# Patient Record
Sex: MALE | Race: WHITE | NOT HISPANIC OR LATINO | Employment: FULL TIME | URBAN - METROPOLITAN AREA
[De-identification: names, ages, dates, MRNs, and addresses within clinical notes are randomized per-mention and may not be internally consistent; named-entity substitution may affect disease eponyms.]

---

## 2017-02-05 ENCOUNTER — APPOINTMENT (OUTPATIENT)
Dept: GENERAL RADIOLOGY | Facility: HOSPITAL | Age: 27
End: 2017-02-05

## 2017-02-05 PROCEDURE — 73130 X-RAY EXAM OF HAND: CPT | Performed by: FAMILY MEDICINE

## 2017-05-18 ENCOUNTER — OFFICE VISIT (OUTPATIENT)
Dept: GASTROENTEROLOGY | Facility: CLINIC | Age: 27
End: 2017-05-18

## 2017-05-18 VITALS
DIASTOLIC BLOOD PRESSURE: 64 MMHG | SYSTOLIC BLOOD PRESSURE: 110 MMHG | BODY MASS INDEX: 26.75 KG/M2 | HEIGHT: 74 IN | WEIGHT: 208.4 LBS

## 2017-05-18 DIAGNOSIS — K62.5 RECTAL BLEED: Primary | ICD-10-CM

## 2017-05-18 PROCEDURE — 99203 OFFICE O/P NEW LOW 30 MIN: CPT | Performed by: INTERNAL MEDICINE

## 2017-05-18 RX ORDER — SODIUM CHLORIDE 0.9 % (FLUSH) 0.9 %
1-10 SYRINGE (ML) INJECTION AS NEEDED
Status: CANCELLED | OUTPATIENT
Start: 2017-05-18

## 2017-05-31 ENCOUNTER — TELEPHONE (OUTPATIENT)
Dept: GASTROENTEROLOGY | Facility: CLINIC | Age: 27
End: 2017-05-31

## 2017-05-31 ENCOUNTER — OUTSIDE FACILITY SERVICE (OUTPATIENT)
Dept: GASTROENTEROLOGY | Facility: CLINIC | Age: 27
End: 2017-05-31

## 2017-05-31 PROCEDURE — 45378 DIAGNOSTIC COLONOSCOPY: CPT | Performed by: INTERNAL MEDICINE

## 2017-05-31 RX ORDER — HYDROCORTISONE ACETATE 25 MG/1
25 SUPPOSITORY RECTAL NIGHTLY
Qty: 30 SUPPOSITORY | Refills: 1 | Status: SHIPPED | OUTPATIENT
Start: 2017-05-31 | End: 2019-06-11

## 2017-06-07 ENCOUNTER — TELEPHONE (OUTPATIENT)
Dept: GASTROENTEROLOGY | Facility: CLINIC | Age: 27
End: 2017-06-07

## 2017-06-07 NOTE — TELEPHONE ENCOUNTER
----- Message from Jeffry Heard MD sent at 6/6/2017 12:46 PM EDT -----  Regarding: FW: Procedure    Office visit 8 weeks    ----- Message -----     From: Samuel Antony     Sent: 6/6/2017  12:09 PM       To: Jeffry Heard MD  Subject: Procedure                                        Scanned in

## 2019-06-02 ENCOUNTER — HOSPITAL ENCOUNTER (INPATIENT)
Facility: HOSPITAL | Age: 29
LOS: 2 days | Discharge: HOME | End: 2019-06-04
Attending: EMERGENCY MEDICINE | Admitting: HOSPITALIST

## 2019-06-02 ENCOUNTER — APPOINTMENT (OUTPATIENT)
Dept: GENERAL RADIOLOGY | Facility: HOSPITAL | Age: 29
End: 2019-06-02

## 2019-06-02 DIAGNOSIS — S91.031A PUNCTURE WOUND OF RIGHT ANKLE, INITIAL ENCOUNTER: Primary | ICD-10-CM

## 2019-06-02 DIAGNOSIS — L03.115 CELLULITIS OF RIGHT LOWER EXTREMITY: ICD-10-CM

## 2019-06-02 PROBLEM — L03.90 CELLULITIS: Status: ACTIVE | Noted: 2019-06-02

## 2019-06-02 LAB
ALBUMIN SERPL-MCNC: 4.6 G/DL (ref 3.5–5.2)
ALBUMIN/GLOB SERPL: 1.5 G/DL
ALP SERPL-CCNC: 64 U/L (ref 39–117)
ALT SERPL W P-5'-P-CCNC: 30 U/L (ref 1–41)
ANION GAP SERPL CALCULATED.3IONS-SCNC: 14.2 MMOL/L
AST SERPL-CCNC: 29 U/L (ref 1–40)
BASOPHILS # BLD AUTO: 0.06 10*3/MM3 (ref 0–0.2)
BASOPHILS NFR BLD AUTO: 0.3 % (ref 0–1.5)
BILIRUB SERPL-MCNC: 1 MG/DL (ref 0.2–1.2)
BUN BLD-MCNC: 12 MG/DL (ref 6–20)
BUN/CREAT SERPL: 11 (ref 7–25)
CALCIUM SPEC-SCNC: 9.4 MG/DL (ref 8.6–10.5)
CHLORIDE SERPL-SCNC: 97 MMOL/L (ref 98–107)
CO2 SERPL-SCNC: 24.8 MMOL/L (ref 22–29)
CREAT BLD-MCNC: 1.09 MG/DL (ref 0.76–1.27)
D-LACTATE SERPL-SCNC: 0.9 MMOL/L (ref 0.5–2)
DEPRECATED RDW RBC AUTO: 49.1 FL (ref 37–54)
EOSINOPHIL # BLD AUTO: 0.04 10*3/MM3 (ref 0–0.4)
EOSINOPHIL NFR BLD AUTO: 0.2 % (ref 0.3–6.2)
ERYTHROCYTE [DISTWIDTH] IN BLOOD BY AUTOMATED COUNT: 14.2 % (ref 12.3–15.4)
GFR SERPL CREATININE-BSD FRML MDRD: 81 ML/MIN/1.73
GLOBULIN UR ELPH-MCNC: 3 GM/DL
GLUCOSE BLD-MCNC: 107 MG/DL (ref 65–99)
HCT VFR BLD AUTO: 42.3 % (ref 37.5–51)
HGB BLD-MCNC: 14.1 G/DL (ref 13–17.7)
IMM GRANULOCYTES # BLD AUTO: 0.13 10*3/MM3 (ref 0–0.05)
IMM GRANULOCYTES NFR BLD AUTO: 0.6 % (ref 0–0.5)
LYMPHOCYTES # BLD AUTO: 1.2 10*3/MM3 (ref 0.7–3.1)
LYMPHOCYTES NFR BLD AUTO: 5.3 % (ref 19.6–45.3)
MCH RBC QN AUTO: 31.6 PG (ref 26.6–33)
MCHC RBC AUTO-ENTMCNC: 33.3 G/DL (ref 31.5–35.7)
MCV RBC AUTO: 94.8 FL (ref 79–97)
MONOCYTES # BLD AUTO: 3.09 10*3/MM3 (ref 0.1–0.9)
MONOCYTES NFR BLD AUTO: 13.7 % (ref 5–12)
NEUTROPHILS # BLD AUTO: 17.99 10*3/MM3 (ref 1.7–7)
NEUTROPHILS NFR BLD AUTO: 79.9 % (ref 42.7–76)
NRBC BLD AUTO-RTO: 0 /100 WBC (ref 0–0.2)
PLATELET # BLD AUTO: 235 10*3/MM3 (ref 140–450)
PMV BLD AUTO: 9.8 FL (ref 6–12)
POTASSIUM BLD-SCNC: 3.8 MMOL/L (ref 3.5–5.2)
PROT SERPL-MCNC: 7.6 G/DL (ref 6–8.5)
RBC # BLD AUTO: 4.46 10*6/MM3 (ref 4.14–5.8)
SODIUM BLD-SCNC: 136 MMOL/L (ref 136–145)
WBC NRBC COR # BLD: 22.51 10*3/MM3 (ref 3.4–10.8)

## 2019-06-02 PROCEDURE — 25010000002 ENOXAPARIN PER 10 MG: Performed by: HOSPITALIST

## 2019-06-02 PROCEDURE — 25010000002 PIPERACILLIN SOD-TAZOBACTAM PER 1 G: Performed by: NURSE PRACTITIONER

## 2019-06-02 PROCEDURE — 87040 BLOOD CULTURE FOR BACTERIA: CPT | Performed by: NURSE PRACTITIONER

## 2019-06-02 PROCEDURE — 25010000002 ONDANSETRON PER 1 MG: Performed by: NURSE PRACTITIONER

## 2019-06-02 PROCEDURE — 83605 ASSAY OF LACTIC ACID: CPT | Performed by: NURSE PRACTITIONER

## 2019-06-02 PROCEDURE — 25010000002 HYDROMORPHONE PER 4 MG: Performed by: NURSE PRACTITIONER

## 2019-06-02 PROCEDURE — 99284 EMERGENCY DEPT VISIT MOD MDM: CPT

## 2019-06-02 PROCEDURE — 80053 COMPREHEN METABOLIC PANEL: CPT | Performed by: NURSE PRACTITIONER

## 2019-06-02 PROCEDURE — 85025 COMPLETE CBC W/AUTO DIFF WBC: CPT | Performed by: NURSE PRACTITIONER

## 2019-06-02 PROCEDURE — 73610 X-RAY EXAM OF ANKLE: CPT

## 2019-06-02 RX ORDER — ACETAMINOPHEN 325 MG/1
650 TABLET ORAL EVERY 4 HOURS PRN
Status: DISCONTINUED | OUTPATIENT
Start: 2019-06-02 | End: 2019-06-04 | Stop reason: HOSPADM

## 2019-06-02 RX ORDER — IBUPROFEN 600 MG/1
600 TABLET ORAL EVERY 6 HOURS PRN
Status: DISCONTINUED | OUTPATIENT
Start: 2019-06-02 | End: 2019-06-04 | Stop reason: HOSPADM

## 2019-06-02 RX ORDER — ONDANSETRON 2 MG/ML
4 INJECTION INTRAMUSCULAR; INTRAVENOUS EVERY 6 HOURS PRN
Status: DISCONTINUED | OUTPATIENT
Start: 2019-06-02 | End: 2019-06-04 | Stop reason: HOSPADM

## 2019-06-02 RX ORDER — BISACODYL 10 MG
10 SUPPOSITORY, RECTAL RECTAL DAILY PRN
Status: DISCONTINUED | OUTPATIENT
Start: 2019-06-02 | End: 2019-06-04 | Stop reason: HOSPADM

## 2019-06-02 RX ORDER — SODIUM CHLORIDE 0.9 % (FLUSH) 0.9 %
3-10 SYRINGE (ML) INJECTION AS NEEDED
Status: DISCONTINUED | OUTPATIENT
Start: 2019-06-02 | End: 2019-06-04 | Stop reason: HOSPADM

## 2019-06-02 RX ORDER — SODIUM CHLORIDE 0.9 % (FLUSH) 0.9 %
10 SYRINGE (ML) INJECTION AS NEEDED
Status: DISCONTINUED | OUTPATIENT
Start: 2019-06-02 | End: 2019-06-04 | Stop reason: HOSPADM

## 2019-06-02 RX ORDER — HYDROCODONE BITARTRATE AND ACETAMINOPHEN 10; 325 MG/1; MG/1
1 TABLET ORAL EVERY 6 HOURS PRN
Status: DISCONTINUED | OUTPATIENT
Start: 2019-06-02 | End: 2019-06-04 | Stop reason: HOSPADM

## 2019-06-02 RX ORDER — ONDANSETRON 2 MG/ML
4 INJECTION INTRAMUSCULAR; INTRAVENOUS ONCE
Status: COMPLETED | OUTPATIENT
Start: 2019-06-02 | End: 2019-06-02

## 2019-06-02 RX ORDER — ONDANSETRON 4 MG/1
4 TABLET, FILM COATED ORAL EVERY 6 HOURS PRN
Status: DISCONTINUED | OUTPATIENT
Start: 2019-06-02 | End: 2019-06-04 | Stop reason: HOSPADM

## 2019-06-02 RX ORDER — SODIUM CHLORIDE 0.9 % (FLUSH) 0.9 %
3 SYRINGE (ML) INJECTION EVERY 12 HOURS SCHEDULED
Status: DISCONTINUED | OUTPATIENT
Start: 2019-06-02 | End: 2019-06-04 | Stop reason: HOSPADM

## 2019-06-02 RX ORDER — FAMOTIDINE 20 MG/1
20 TABLET, FILM COATED ORAL DAILY
Status: DISCONTINUED | OUTPATIENT
Start: 2019-06-02 | End: 2019-06-04 | Stop reason: HOSPADM

## 2019-06-02 RX ORDER — BISACODYL 5 MG/1
5 TABLET, DELAYED RELEASE ORAL DAILY PRN
Status: DISCONTINUED | OUTPATIENT
Start: 2019-06-02 | End: 2019-06-04 | Stop reason: HOSPADM

## 2019-06-02 RX ORDER — HYDROMORPHONE HYDROCHLORIDE 1 MG/ML
0.5 INJECTION, SOLUTION INTRAMUSCULAR; INTRAVENOUS; SUBCUTANEOUS ONCE
Status: COMPLETED | OUTPATIENT
Start: 2019-06-02 | End: 2019-06-02

## 2019-06-02 RX ADMIN — TAZOBACTAM SODIUM AND PIPERACILLIN SODIUM 3.38 G: 375; 3 INJECTION, SOLUTION INTRAVENOUS at 18:00

## 2019-06-02 RX ADMIN — TAZOBACTAM SODIUM AND PIPERACILLIN SODIUM 3.38 G: 375; 3 INJECTION, SOLUTION INTRAVENOUS at 23:51

## 2019-06-02 RX ADMIN — ENOXAPARIN SODIUM 40 MG: 40 INJECTION SUBCUTANEOUS at 20:04

## 2019-06-02 RX ADMIN — FAMOTIDINE 20 MG: 20 TABLET, FILM COATED ORAL at 22:31

## 2019-06-02 RX ADMIN — HYDROMORPHONE HYDROCHLORIDE 0.5 MG: 1 INJECTION, SOLUTION INTRAMUSCULAR; INTRAVENOUS; SUBCUTANEOUS at 17:42

## 2019-06-02 RX ADMIN — HYDROCODONE BITARTRATE AND ACETAMINOPHEN 1 TABLET: 10; 325 TABLET ORAL at 21:36

## 2019-06-02 RX ADMIN — ONDANSETRON HYDROCHLORIDE 4 MG: 2 SOLUTION INTRAMUSCULAR; INTRAVENOUS at 17:41

## 2019-06-02 RX ADMIN — Medication 3 ML: at 21:29

## 2019-06-02 NOTE — H&P
Name: Rafy Oliveira ADMIT: 2019   : 1990  PCP: Provider, No Known    MRN: [id number] LOS: 0 days   AGE/SEX: 28 y.o. male  ROOM:      Chief Complaint   Patient presents with   • Puncture Wound     right heel       Subjective   Patient is a 28 y.o. male who presents to [facility] with the above chief complaint.  Yesterday he was playing volleyball and the ball rolled into a dirty pond.  He walked down into the water to get the ball and stepped on a board that swung forward and a nail poking out of the board punctured an area on the back part of his ankle.  He pulled the nail out without any difficulty cleaned the area and went about his day.  He went to the urgent care last night was seen and evaluated and given a prescription for amoxicillin.  He took 1 pill but the pain and swelling have greatly and increased over the last 24 hours.  At this point it hurts to put any weight on his ankle.  He is also noticed some red streaking up the side of his leg.  He denies any fevers.  He is in relative good health denies any other medical issues.  He is up-to-date on his tetanus shot.    History of Present Illness    Past Medical History:   Diagnosis Date   • Environmental allergies      Past Surgical History:   Procedure Laterality Date   • HAND SURGERY     • HAND SURGERY Left     titanium luz and 5 screws put in.     History reviewed. No pertinent family history.  Social History     Tobacco Use   • Smoking status: Never Smoker   Substance Use Topics   • Alcohol use: Yes     Comment: weekends   • Drug use: No       (Not in a hospital admission)  Allergies:  Patient has no known allergies.    Review of Systems   Constitutional: Negative for fatigue and fever.   HENT: Negative for congestion, nosebleeds and sore throat.    Eyes: Negative for photophobia, redness and visual disturbance.   Respiratory: Negative for cough, shortness of breath and wheezing.    Cardiovascular: Negative for  chest pain, palpitations and leg swelling.   Endocrine: Negative for polydipsia, polyphagia and polyuria.   Genitourinary: Negative for difficulty urinating, dysuria and hematuria.   Musculoskeletal: Negative for back pain, neck pain and neck stiffness.   Skin: Negative for pallor and rash.   Allergic/Immunologic: Negative for environmental allergies and immunocompromised state.   Neurological: Negative for dizziness, light-headedness and headaches.   Hematological: Negative for adenopathy. Does not bruise/bleed easily.   Psychiatric/Behavioral: Negative for agitation, behavioral problems and confusion.        Objective    Vital Signs  Temp:  [97.1 °F (36.2 °C)] 97.1 °F (36.2 °C)  Heart Rate:  [79-88] 88  Resp:  [15] 15  BP: (118-140)/(72-78) 118/72  SpO2:  [96 %-98 %] 96 %  on   ;   Device (Oxygen Therapy): room air  Body mass index is 28.25 kg/m².    Physical Exam   Constitutional: He is oriented to person, place, and time. He appears well-developed and well-nourished. No distress.   HENT:   Head: Normocephalic and atraumatic.   Cardiovascular: Normal rate, regular rhythm and normal heart sounds.   Pulmonary/Chest: Effort normal and breath sounds normal.   Abdominal: Soft. Bowel sounds are normal. He exhibits no distension.   Musculoskeletal: Normal range of motion. He exhibits no edema.   No pain with plantarflexion but noted significant pain with dorsiflexion of the ankle.  He also has pain with manipulation of the great toe but otherwise has no pain with movement of any of his other phalanges.   Neurological: He is alert and oriented to person, place, and time.   Skin: Skin is warm and dry. Capillary refill takes less than 2 seconds.   See image below   Psychiatric: He has a normal mood and affect. His behavior is normal.   Nursing note and vitals reviewed.          Results Review:   I reviewed the patient's new clinical results.  Results from last 7 days   Lab Units 06/02/19  1746   WBC 10*3/mm3 22.51*    HEMOGLOBIN g/dL 14.1   PLATELETS 10*3/mm3 235     Results from last 7 days   Lab Units 06/02/19  1745   SODIUM mmol/L 136   POTASSIUM mmol/L 3.8   CHLORIDE mmol/L 97*   CO2 mmol/L 24.8   BUN mg/dL 12   CREATININE mg/dL 1.09   GLUCOSE mg/dL 107*   ALBUMIN g/dL 4.60   BILIRUBIN mg/dL 1.0   ALK PHOS U/L 64   AST (SGOT) U/L 29   ALT (SGPT) U/L 30   Estimated Creatinine Clearance: 128.4 mL/min (by C-G formula based on SCr of 1.09 mg/dL).        Invalid input(s): LDLCALC    XR Ankle 3+ View Right           Assessment/Plan       Puncture wound of right ankle    Cellulitis      Assessment & Plan  This is a 28-year-old gentleman in relative good health who presents to the hospital with swelling and pain in his right ankle after a puncture injury.  Currently this appears to be a localized skin soft tissue infection related to this puncture injury.  However given the pain on dorsiflexion there is a little bit of concern for possible tendon involvement.  Plan to observe him overnight on IV antibiotics if pain continues despite aggressive antibiotic therapy he should probably have an MRI of the ankle to evaluate for tendon involvement or deeper infection.      I discussed the patients findings and my recommendations with patient, family and nursing staff.    Soy Radford MD  [facility]  06/02/19  7:11 PM

## 2019-06-02 NOTE — ED PROVIDER NOTES
MD ATTESTATION NOTE    Pt presents c/o L foot pain secondary to a puncture wound starting after stepping on a board with a nail in it one day ago. He began Amoxicillin 4 hours ago. He confirms pain and edema on his L foot but denies all other complaints at this time.    Limited physical exam:  Patient is nontoxic appearing. NAD.   Lungs/cardiovascular: lungs CTAB; RRR  Abdomen: soft, non-tender  Back/extremities: redness and swelling R ankle. Puncture wound medial ankle. Streaking up R calf.       Plan: Order blood cultures. Discussed potential plan of admission for IV abx.      The CANDIDA and I have discussed this patient's history, physical exam, and treatment plan.  I have reviewed the documentation and personally had a face to face interaction with the patient. I affirm the documentation and agree with the treatment and plan.  The attached note describes my personal findings.      Documentation assistance provided by leda Kc for Dr Jeff MD.  Information recorded by the scribe was done at my direction and has been verified and validated by me.       Bethany Kc  06/02/19 1147       Yousif Aguilar MD  06/02/19 2536

## 2019-06-02 NOTE — ED PROVIDER NOTES
EMERGENCY DEPARTMENT ENCOUNTER    CHIEF COMPLAINT  Chief Complaint: R foot puncture wound  History given by:self  History limited by:nothing  Time Seen: 1641  Room Number: P779/1  PMD: Provider, No Known      HPI:  Pt is a 28 y.o. male who presents with a worsening R foot puncture wound that occurred yesterday around 1630. Pt states he ran into a pond, barefoot,  to retreive a volleyball when he stepped on a piece of wood with a nail in it. Pt states he did not have to pull the nail out of his foot, as it did not stay attached. He went to [facility], where they cleaned the wound and prescribed him amoxicillin. Pt has taken 1 dose since prescribed.  Patient also complains of chills, HA, decreased appetite and myalgia.  Patient denies fever. TDAP is UTD.    Duration: Yesterday at 1630  Timing:constant  Location:R foot  Radiation:none stated  Quality:wound  Intensity/Severity:moderate  Progression:worsening  Associated Symptoms:chills, HA, decreased, appetite and myalgia  Aggravating Factors:none stated  Alleviating Factors:none stated  Previous Episodes:none stated  Treatment before arrival:Pt has taken 1 dose of amoxicillin.     PAST MEDICAL HISTORY  Active Ambulatory Problems     Diagnosis Date Noted   • No Active Ambulatory Problems     Resolved Ambulatory Problems     Diagnosis Date Noted   • No Resolved Ambulatory Problems     Past Medical History:   Diagnosis Date   • Environmental allergies        PAST SURGICAL HISTORY  Past Surgical History:   Procedure Laterality Date   • HAND SURGERY     • HAND SURGERY Left     titanium luz and 5 screws put in.       FAMILY HISTORY  History reviewed. No pertinent family history.    SOCIAL HISTORY  Social History     Socioeconomic History   • Marital status: Single     Spouse name: Not on file   • Number of children: Not on file   • Years of education: Not on file   • Highest education level: Not on file   Tobacco Use   • Smoking status: Never Smoker   Substance and Sexual  Activity   • Alcohol use: Yes     Comment: weekends   • Drug use: No         ALLERGIES  Patient has no known allergies.    REVIEW OF SYSTEMS  Review of Systems   Constitutional: Positive for appetite change (Decreased) and chills. Negative for fever.   HENT: Negative for sore throat.    Respiratory: Negative for shortness of breath.    Cardiovascular: Negative for chest pain.   Gastrointestinal: Negative for nausea and vomiting.   Genitourinary: Negative for dysuria.   Musculoskeletal: Positive for myalgias. Negative for back pain.   Skin: Positive for wound (R foot). Negative for rash.   Neurological: Positive for headaches. Negative for dizziness.   Psychiatric/Behavioral: The patient is not nervous/anxious.        PHYSICAL EXAM  ED Triage Vitals   Temp Heart Rate Resp BP SpO2   06/02/19 1604 06/02/19 1604 06/02/19 1604 06/02/19 1635 06/02/19 1604   97.1 °F (36.2 °C) 79 15 140/78 98 %       Physical Exam   Constitutional: He is oriented to person, place, and time and well-developed, well-nourished, and in no distress. No distress.   HENT:   Head: Atraumatic.   Mouth/Throat: Mucous membranes are normal.   Eyes: No scleral icterus.   Neck: Normal range of motion.   Cardiovascular: Normal rate, regular rhythm and normal heart sounds.   Pulses:       Dorsalis pedis pulses are 2+ on the right side, and 2+ on the left side.   Pulmonary/Chest: Effort normal and breath sounds normal.   Musculoskeletal: Normal range of motion.   Puncture wound to the R medial malleolus with edema, erythema and warmth surrounding the area.  Pt is able to move toes, flex and extend foot without difficulty.   Neurological: He is alert and oriented to person, place, and time. He has normal sensation, normal strength and normal reflexes.   Skin: Skin is warm and dry.   Psychiatric: Mood and affect normal.   Nursing note and vitals reviewed.      LAB RESULTS  Recent Results (from the past 24 hour(s))   Comprehensive Metabolic Panel    Collection  Time: 06/02/19  5:45 PM   Result Value Ref Range    Glucose 107 (H) 65 - 99 mg/dL    BUN 12 6 - 20 mg/dL    Creatinine 1.09 0.76 - 1.27 mg/dL    Sodium 136 136 - 145 mmol/L    Potassium 3.8 3.5 - 5.2 mmol/L    Chloride 97 (L) 98 - 107 mmol/L    CO2 24.8 22.0 - 29.0 mmol/L    Calcium 9.4 8.6 - 10.5 mg/dL    Total Protein 7.6 6.0 - 8.5 g/dL    Albumin 4.60 3.50 - 5.20 g/dL    ALT (SGPT) 30 1 - 41 U/L    AST (SGOT) 29 1 - 40 U/L    Alkaline Phosphatase 64 39 - 117 U/L    Total Bilirubin 1.0 0.2 - 1.2 mg/dL    eGFR Non African Amer 81 >60 mL/min/1.73    Globulin 3.0 gm/dL    A/G Ratio 1.5 g/dL    BUN/Creatinine Ratio 11.0 7.0 - 25.0    Anion Gap 14.2 mmol/L   Lactic Acid, Plasma    Collection Time: 06/02/19  5:45 PM   Result Value Ref Range    Lactate 0.9 0.5 - 2.0 mmol/L   CBC Auto Differential    Collection Time: 06/02/19  5:46 PM   Result Value Ref Range    WBC 22.51 (H) 3.40 - 10.80 10*3/mm3    RBC 4.46 4.14 - 5.80 10*6/mm3    Hemoglobin 14.1 13.0 - 17.7 g/dL    Hematocrit 42.3 37.5 - 51.0 %    MCV 94.8 79.0 - 97.0 fL    MCH 31.6 26.6 - 33.0 pg    MCHC 33.3 31.5 - 35.7 g/dL    RDW 14.2 12.3 - 15.4 %    RDW-SD 49.1 37.0 - 54.0 fl    MPV 9.8 6.0 - 12.0 fL    Platelets 235 140 - 450 10*3/mm3    Neutrophil % 79.9 (H) 42.7 - 76.0 %    Lymphocyte % 5.3 (L) 19.6 - 45.3 %    Monocyte % 13.7 (H) 5.0 - 12.0 %    Eosinophil % 0.2 (L) 0.3 - 6.2 %    Basophil % 0.3 0.0 - 1.5 %    Immature Grans % 0.6 (H) 0.0 - 0.5 %    Neutrophils, Absolute 17.99 (H) 1.70 - 7.00 10*3/mm3    Lymphocytes, Absolute 1.20 0.70 - 3.10 10*3/mm3    Monocytes, Absolute 3.09 (H) 0.10 - 0.90 10*3/mm3    Eosinophils, Absolute 0.04 0.00 - 0.40 10*3/mm3    Basophils, Absolute 0.06 0.00 - 0.20 10*3/mm3    Immature Grans, Absolute 0.13 (H) 0.00 - 0.05 10*3/mm3    nRBC 0.0 0.0 - 0.2 /100 WBC       I ordered the above labs and reviewed the results    RADIOLOGY  XR Ankle 3+ View Right   Three views right foot showed no evidence of fracture or of a  radiopaque  foreign body.          I ordered the above noted radiological studies and reviewed the images on the PACS system.    PROGRESS AND CONSULTS    1706 Reviewed pt's history and workup with Dr. Aguilar.  At bedside evaluation, they agree with the plan of care.    1859 Rechecked pt. Pt is resting comfortably with a BP of 140/78. Discussed w/pt WBC is elevated. Plan is to admit for further evaluation. Pt understands and agrees with the plan. All questions were answered    1906 Discussed pt w/Dr. Radford ([facility]) who agrees to admit pt to telemetry.     Final diagnoses:   Puncture wound of right ankle, initial encounter   Cellulitis of right lower extremity       ADMISSION    Discussed treatment plan and reason for admission with pt/family and admitting physician.  Pt/family voiced understanding of the plan for admission for further testing/treatment as needed.         COURSE & MEDICAL DECISION MAKING  Pertinent Labs and Imaging studies that were ordered and reviewed are noted above.  Results were reviewed/discussed with the patient and they were also made aware of online assess.   Pt also made aware that some labs, such as cultures, will not be resulted during ER visit and follow up with PMD is necessary.     MEDICATIONS GIVEN IN ER  Medications   sodium chloride 0.9 % flush 10 mL (not administered)   sodium chloride 0.9 % flush 3 mL (not administered)   sodium chloride 0.9 % flush 3-10 mL (not administered)   acetaminophen (TYLENOL) tablet 650 mg (not administered)   bisacodyl (DULCOLAX) EC tablet 5 mg (not administered)   bisacodyl (DULCOLAX) suppository 10 mg (not administered)   enoxaparin (LOVENOX) syringe 40 mg (40 mg Subcutaneous Given 6/2/19 2004)   ondansetron (ZOFRAN) tablet 4 mg (not administered)     Or   ondansetron (ZOFRAN) injection 4 mg (not administered)   famotidine (PEPCID) tablet 20 mg (not administered)   ibuprofen (ADVIL,MOTRIN) tablet 600 mg (not administered)   HYDROcodone-acetaminophen (NORCO)  " MG per tablet 1 tablet (not administered)   piperacillin-tazobactam (ZOSYN) 3.375 g in iso-osmotic dextrose 50 ml (premix) (not administered)   HYDROmorphone (DILAUDID) injection 0.5 mg (0.5 mg Intravenous Given 6/2/19 1742)   ondansetron (ZOFRAN) injection 4 mg (4 mg Intravenous Given 6/2/19 1741)   piperacillin-tazobactam (ZOSYN) 3.375 g in iso-osmotic dextrose 50 ml (premix) (0 g Intravenous Stopped 6/2/19 1900)       /76 (BP Location: Left arm, Patient Position: Lying)   Pulse 75   Temp 98.6 °F (37 °C) (Oral)   Resp 16   Ht 188 cm (74\")   Wt 99.8 kg (220 lb)   SpO2 98%   BMI 28.25 kg/m²       I personally reviewed the past medical history, past surgical history, social history, family history, current medications and allergies as they appear in this chart.  The scribe's note accurately reflects the work and decisions made by me.     Documentation assistance provided by leda [place] for DEANA Trivedi on 6/2/2019 at 9:11 PM. Information recorded by the scribe was done at my direction and has been verified and validated by me.         Darvin Gimenez  06/02/19 2112       Julieth Sánchez APRN  06/02/19 2118    "

## 2019-06-03 ENCOUNTER — APPOINTMENT (OUTPATIENT)
Dept: MRI IMAGING | Facility: HOSPITAL | Age: 29
End: 2019-06-03

## 2019-06-03 LAB
ANION GAP SERPL CALCULATED.3IONS-SCNC: 11.8 MMOL/L
BUN BLD-MCNC: 12 MG/DL (ref 6–20)
BUN/CREAT SERPL: 10.9 (ref 7–25)
CALCIUM SPEC-SCNC: 8.9 MG/DL (ref 8.6–10.5)
CHLORIDE SERPL-SCNC: 100 MMOL/L (ref 98–107)
CO2 SERPL-SCNC: 25.2 MMOL/L (ref 22–29)
CREAT BLD-MCNC: 1.1 MG/DL (ref 0.76–1.27)
CRP SERPL-MCNC: 12.11 MG/DL (ref 0–0.5)
DEPRECATED RDW RBC AUTO: 50 FL (ref 37–54)
ERYTHROCYTE [DISTWIDTH] IN BLOOD BY AUTOMATED COUNT: 14.3 % (ref 12.3–15.4)
GFR SERPL CREATININE-BSD FRML MDRD: 80 ML/MIN/1.73
GLUCOSE BLD-MCNC: 102 MG/DL (ref 65–99)
HCT VFR BLD AUTO: 40.7 % (ref 37.5–51)
HGB BLD-MCNC: 13.5 G/DL (ref 13–17.7)
MAGNESIUM SERPL-MCNC: 2.3 MG/DL (ref 1.6–2.6)
MCH RBC QN AUTO: 32 PG (ref 26.6–33)
MCHC RBC AUTO-ENTMCNC: 33.2 G/DL (ref 31.5–35.7)
MCV RBC AUTO: 96.4 FL (ref 79–97)
PHOSPHATE SERPL-MCNC: 2.9 MG/DL (ref 2.5–4.5)
PLATELET # BLD AUTO: 218 10*3/MM3 (ref 140–450)
PMV BLD AUTO: 10.2 FL (ref 6–12)
POTASSIUM BLD-SCNC: 4.2 MMOL/L (ref 3.5–5.2)
RBC # BLD AUTO: 4.22 10*6/MM3 (ref 4.14–5.8)
SODIUM BLD-SCNC: 137 MMOL/L (ref 136–145)
WBC NRBC COR # BLD: 16.62 10*3/MM3 (ref 3.4–10.8)

## 2019-06-03 PROCEDURE — 80048 BASIC METABOLIC PNL TOTAL CA: CPT | Performed by: HOSPITALIST

## 2019-06-03 PROCEDURE — 25010000002 ENOXAPARIN PER 10 MG: Performed by: HOSPITALIST

## 2019-06-03 PROCEDURE — 36415 COLL VENOUS BLD VENIPUNCTURE: CPT | Performed by: HOSPITALIST

## 2019-06-03 PROCEDURE — 99223 1ST HOSP IP/OBS HIGH 75: CPT | Performed by: INTERNAL MEDICINE

## 2019-06-03 PROCEDURE — 25010000002 PIPERACILLIN SOD-TAZOBACTAM PER 1 G: Performed by: HOSPITALIST

## 2019-06-03 PROCEDURE — 84100 ASSAY OF PHOSPHORUS: CPT | Performed by: HOSPITALIST

## 2019-06-03 PROCEDURE — 25010000002 VANCOMYCIN 10 G RECONSTITUTED SOLUTION: Performed by: INTERNAL MEDICINE

## 2019-06-03 PROCEDURE — 25010000002 VANCOMYCIN PER 500 MG: Performed by: INTERNAL MEDICINE

## 2019-06-03 PROCEDURE — 85027 COMPLETE CBC AUTOMATED: CPT | Performed by: HOSPITALIST

## 2019-06-03 PROCEDURE — 0 GADOBENATE DIMEGLUMINE 529 MG/ML SOLUTION: Performed by: HOSPITALIST

## 2019-06-03 PROCEDURE — A9577 INJ MULTIHANCE: HCPCS | Performed by: HOSPITALIST

## 2019-06-03 PROCEDURE — 97161 PT EVAL LOW COMPLEX 20 MIN: CPT

## 2019-06-03 PROCEDURE — 86140 C-REACTIVE PROTEIN: CPT | Performed by: INTERNAL MEDICINE

## 2019-06-03 PROCEDURE — 83735 ASSAY OF MAGNESIUM: CPT | Performed by: HOSPITALIST

## 2019-06-03 PROCEDURE — 73723 MRI JOINT LWR EXTR W/O&W/DYE: CPT

## 2019-06-03 PROCEDURE — 97110 THERAPEUTIC EXERCISES: CPT

## 2019-06-03 RX ORDER — CEFTRIAXONE SODIUM 2 G/50ML
2 INJECTION, SOLUTION INTRAVENOUS EVERY 24 HOURS
Status: DISCONTINUED | OUTPATIENT
Start: 2019-06-03 | End: 2019-06-03

## 2019-06-03 RX ORDER — CIPROFLOXACIN 500 MG/1
500 TABLET, FILM COATED ORAL EVERY 12 HOURS SCHEDULED
Status: DISCONTINUED | OUTPATIENT
Start: 2019-06-03 | End: 2019-06-03

## 2019-06-03 RX ORDER — CIPROFLOXACIN 750 MG/1
750 TABLET, FILM COATED ORAL EVERY 12 HOURS SCHEDULED
Status: DISCONTINUED | OUTPATIENT
Start: 2019-06-03 | End: 2019-06-04

## 2019-06-03 RX ORDER — AMOXICILLIN AND CLAVULANATE POTASSIUM 500; 125 MG/1; MG/1
1 TABLET, FILM COATED ORAL 2 TIMES DAILY
COMMUNITY
Start: 2019-06-02 | End: 2019-06-04 | Stop reason: HOSPADM

## 2019-06-03 RX ORDER — VANCOMYCIN HYDROCHLORIDE 1 G/200ML
1000 INJECTION, SOLUTION INTRAVENOUS EVERY 8 HOURS
Status: DISCONTINUED | OUTPATIENT
Start: 2019-06-03 | End: 2019-06-04

## 2019-06-03 RX ADMIN — CIPROFLOXACIN HYDROCHLORIDE 750 MG: 750 TABLET, FILM COATED ORAL at 12:55

## 2019-06-03 RX ADMIN — POLYETHYLENE GLYCOL 3350 17 G: 17 POWDER, FOR SOLUTION ORAL at 21:23

## 2019-06-03 RX ADMIN — HYDROCODONE BITARTRATE AND ACETAMINOPHEN 1 TABLET: 10; 325 TABLET ORAL at 17:44

## 2019-06-03 RX ADMIN — GADOBENATE DIMEGLUMINE 20 ML: 529 INJECTION, SOLUTION INTRAVENOUS at 12:32

## 2019-06-03 RX ADMIN — CIPROFLOXACIN HYDROCHLORIDE 750 MG: 750 TABLET, FILM COATED ORAL at 21:23

## 2019-06-03 RX ADMIN — VANCOMYCIN HYDROCHLORIDE 1000 MG: 1 INJECTION, SOLUTION INTRAVENOUS at 21:22

## 2019-06-03 RX ADMIN — FAMOTIDINE 20 MG: 20 TABLET, FILM COATED ORAL at 08:19

## 2019-06-03 RX ADMIN — VANCOMYCIN HYDROCHLORIDE 2000 MG: 10 INJECTION, POWDER, LYOPHILIZED, FOR SOLUTION INTRAVENOUS at 12:55

## 2019-06-03 RX ADMIN — HYDROCODONE BITARTRATE AND ACETAMINOPHEN 1 TABLET: 10; 325 TABLET ORAL at 10:31

## 2019-06-03 RX ADMIN — TAZOBACTAM SODIUM AND PIPERACILLIN SODIUM 3.38 G: 375; 3 INJECTION, SOLUTION INTRAVENOUS at 08:19

## 2019-06-03 RX ADMIN — ENOXAPARIN SODIUM 40 MG: 40 INJECTION SUBCUTANEOUS at 21:22

## 2019-06-03 RX ADMIN — Medication 3 ML: at 08:30

## 2019-06-03 RX ADMIN — HYDROCODONE BITARTRATE AND ACETAMINOPHEN 1 TABLET: 10; 325 TABLET ORAL at 04:00

## 2019-06-03 NOTE — PLAN OF CARE
Problem: Patient Care Overview  Goal: Plan of Care Review  Outcome: Ongoing (interventions implemented as appropriate)   06/03/19 1351 06/03/19 8134   Coping/Psychosocial   Plan of Care Reviewed With patient --    Plan of Care Review   Progress --  improving   OTHER   Outcome Summary --  Pt admitted with infection to right ankle after sustaining an injury from a nail. IV and PO antibiotics continued, MRI of ankle does not show infection in the bone. Pain controlled with PO pain meds, elevation, and ice. Will continue to monitor.     Goal: Individualization and Mutuality  Outcome: Ongoing (interventions implemented as appropriate)    Goal: Discharge Needs Assessment  Outcome: Ongoing (interventions implemented as appropriate)    Goal: Interprofessional Rounds/Family Conf  Outcome: Ongoing (interventions implemented as appropriate)      Problem: Fall Risk (Adult)  Goal: Absence of Fall  Outcome: Ongoing (interventions implemented as appropriate)      Problem: Infection, Risk/Actual (Adult)  Goal: Identify Related Risk Factors and Signs and Symptoms  Outcome: Ongoing (interventions implemented as appropriate)    Goal: Infection Prevention/Resolution  Outcome: Ongoing (interventions implemented as appropriate)

## 2019-06-03 NOTE — PLAN OF CARE
Problem: Patient Care Overview  Goal: Plan of Care Review  Outcome: Ongoing (interventions implemented as appropriate)   06/03/19 0056   Coping/Psychosocial   Plan of Care Reviewed With patient   Plan of Care Review   Progress no change   OTHER   Outcome Summary Pt admitted on 06/02/2019 with puncture wound to right ankle. VSS. A&O x4. IV antibiotics q8 hours. Ankle wrapped. Dressing clean dry and intact at this time. Pain well controlled with PO pain medication. Will continue to monitor. Plans to d/c home when stable.      Goal: Individualization and Mutuality  Outcome: Ongoing (interventions implemented as appropriate)    Goal: Discharge Needs Assessment  Outcome: Ongoing (interventions implemented as appropriate)    Goal: Interprofessional Rounds/Family Conf  Outcome: Ongoing (interventions implemented as appropriate)      Problem: Fall Risk (Adult)  Goal: Identify Related Risk Factors and Signs and Symptoms  Outcome: Ongoing (interventions implemented as appropriate)    Goal: Absence of Fall  Outcome: Ongoing (interventions implemented as appropriate)      Problem: Infection, Risk/Actual (Adult)  Goal: Identify Related Risk Factors and Signs and Symptoms  Outcome: Ongoing (interventions implemented as appropriate)    Goal: Infection Prevention/Resolution  Outcome: Ongoing (interventions implemented as appropriate)

## 2019-06-03 NOTE — THERAPY EVALUATION
Acute Care - Physical Therapy Initial Evaluation  [facility]     Patient Name: Rafy Oliveira  : 1990  MRN: [id number]  Today's Date: 6/3/2019   Onset of Illness/Injury or Date of Surgery: 19     Referring Physician: Lala      Admit Date: 2019    Visit Dx:     ICD-10-CM ICD-9-CM   1. Puncture wound of right ankle, initial encounter S91.031A 891.0   2. Cellulitis of right lower extremity L03.115 682.6     Patient Active Problem List   Diagnosis   • Puncture wound of right ankle   • Cellulitis     Past Medical History:   Diagnosis Date   • Environmental allergies      Past Surgical History:   Procedure Laterality Date   • HAND SURGERY     • HAND SURGERY Left     titanium luz and 5 screws put in.        PT ASSESSMENT (last 12 hours)      Physical Therapy Evaluation     Row Name 19 1343          PT Evaluation Time/Intention    Subjective Information  complains of;pain  -AR     Document Type  evaluation  -AR     Mode of Treatment  physical therapy  -AR     Patient Effort  good  -AR     Symptoms Noted During/After Treatment  none  -AR     Row Name 19 1343          General Information    Patient Profile Reviewed?  yes  -AR     Onset of Illness/Injury or Date of Surgery  19  -AR     Referring Physician  Lala  -AR     Patient Observations  alert;cooperative;agree to therapy  -AR     Prior Level of Function  independent: playing volleyball during injury  -AR     Equipment Currently Used at Home  none  -AR     Pertinent History of Current Functional Problem  puncture wound from ?nail going into water to retrieve ball  -AR     Equipment Ordered for Patient  crutches Ortho  ordering 6/3  -AR     Barriers to Rehab  none identified  -AR     Row Name 19 1343          Relationship/Environment    Lives With  parent(s)  -AR     Row Name 19 1343          Resource/Environmental Concerns    Current Living Arrangements  home/apartment/condo  -AR     Row Name 19  1343          Stairs Within Home, Primary    Stairs Comment, Within Home, Primary  bedroom upstairs  -AR     Row Name 06/03/19 1343          Cognitive Assessment/Intervention- PT/OT    Orientation Status (Cognition)  oriented x 4  -AR     Follows Commands (Cognition)  WNL  -AR     Row Name 06/03/19 1343          Bed Mobility Assessment/Treatment    Bed Mobility Assessment/Treatment  supine-sit;sit-supine  -AR     Supine-Sit Grainger (Bed Mobility)  conditional independence  -AR     Sit-Supine Grainger (Bed Mobility)  conditional independence  -AR     Assistive Device (Bed Mobility)  head of bed elevated  -AR     Row Name 06/03/19 1343          Transfer Assessment/Treatment    Transfer Assessment/Treatment  sit-stand transfer;stand-sit transfer  -AR     Comment (Transfers)  trial RW and ax. crutches  -AR     Sit-Stand Grainger (Transfers)  contact guard  -AR     Stand-Sit Grainger (Transfers)  supervision  -AR     Row Name 06/03/19 1343          Gait/Stairs Assessment/Training    Gait/Stairs Assessment/Training  gait pattern;distance ambulated  -AR     Grainger Level (Gait)  contact guard  -AR     Assistive Device (Gait)  -- trialed RW and crutches  -AR     Distance in Feet (Gait)  15 w/ RW; 75 w/ crutches no LOB or safety concerns  -AR     Pattern (Gait)  swing-through  -AR     Deviations/Abnormal Patterns (Gait)  antalgic  -AR     Comment (Gait/Stairs)  decreased WB on R LE  -AR     Row Name 06/03/19 1343          General ROM    GENERAL ROM COMMENTS  WFl except R ankle  -AR     Row Name 06/03/19 1343          MMT (Manual Muscle Testing)    General MMT Comments  WFL except  R aknle  -AR     Row Name 06/03/19 1343          Pain Assessment    Additional Documentation  Pain Scale: Numbers Pre/Post-Treatment (Group)  -AR     Row Name 06/03/19 1343          Pain Scale: Numbers Pre/Post-Treatment    Pain Scale: Numbers, Pretreatment  4/10  -AR     Pain Scale: Numbers, Post-Treatment  4/10  -AR     Pain  Location  ankle  -AR     Pain Intervention(s)  Medication (See MAR);Repositioned  -AR     Row Name             Wound 06/02/19 1641 Right posterior;medial ankle puncture    Wound - Properties Group Date first assessed: 06/02/19  -BH Time first assessed: 1641  -BH Side: Right  -BH Orientation: posterior;medial  -BH Location: ankle  -BH Type: puncture  -BH    Row Name 06/03/19 1343          Physical Therapy Clinical Impression    Patient/Family Goals Statement (PT Clinical Impression)  DC home  -AR     Criteria for Skilled Interventions Met (PT Clinical Impression)  yes  -AR     Pathology/Pathophysiology Noted (Describe Specifically for Each System)  musculoskeletal  -AR     Impairments Found (describe specific impairments)  gait, locomotion, and balance;aerobic capacity/endurance  -AR     Rehab Potential (PT Clinical Summary)  good, to achieve stated therapy goals  -AR     Row Name 06/03/19 1343          Vital Signs    O2 Delivery Pre Treatment  room air  -AR     Row Name 06/03/19 1343          Physical Therapy Goals    Stairs Goal Selection (PT)  stairs, PT goal 1  -AR     Additional Documentation  Stairs Goal Selection (PT) (Row)  -AR     Row Name 06/03/19 1343          Stairs Goal 1 (PT)    Activity/Assistive Device (Stairs Goal 1, PT)  stairs, all skills  -AR     [place] Level/Cues Needed (Stairs Goal 1, PT)  contact guard assist  -AR     Number of Stairs (Stairs Goal 1, PT)  4  -AR     Time Frame (Stairs Goal 1, PT)  1 day  -AR     Row Name 06/03/19 1343          Positioning and Restraints    Pre-Treatment Position  in bed  -AR     Post Treatment Position  bed  -AR     In Bed  notified nsg;supine;call light within reach;encouraged to call for assist;with family/caregiver  -AR     Row Name 06/03/19 1343          Living Environment    Home Accessibility  stairs within home;stairs to enter home  -AR       User Key  (r) = Recorded By, (t) = Taken By, (c) = Cosigned By    Initials Name Provider Type      Isa Roberson, RN Registered Nurse    Kita Angel, PT Physical Therapist        Physical Therapy Education     Title: PT OT SLP Therapies (Done)     Topic: Physical Therapy (Done)     Point: Mobility training (Done)     Learning Progress Summary           Patient Acceptance, E, VU by AR at 6/3/2019  1:49 PM                   Point: Home exercise program (Done)     Learning Progress Summary           Patient Acceptance, E, VU by AR at 6/3/2019  1:49 PM                   Point: Body mechanics (Done)     Learning Progress Summary           Patient Acceptance, E, VU by AR at 6/3/2019  1:49 PM                   Point: Precautions (Done)     Learning Progress Summary           Patient Acceptance, E, VU by AR at 6/3/2019  1:49 PM                               User Key     Initials Effective Dates Name Provider Type Discipline    AR 04/03/18 -  Kita Raphael, PT Physical Therapist PT              PT Recommendation and Plan  Anticipated Discharge Disposition (PT): home with assist  Planned Therapy Interventions (PT Eval): balance training, bed mobility training, gait training, home exercise program, patient/family education, ROM (range of motion), stair training, strengthening, transfer training  Therapy Frequency (PT Clinical Impression): (one more visit)  Outcome Summary/Treatment Plan (PT)  Anticipated Equipment Needs at Discharge (PT): crutches  Anticipated Discharge Disposition (PT): home with assist  Plan of Care Reviewed With: patient  Outcome Summary: Puncture wound on R ankle w/ infection limiting tolerance to weight bearing, MRI results pending.  Pt indepnedence prior to admission, playing volleyball at time of injury.  Practiced RW and crutches, increased independence using crutches ambulating in [place], no safety concerns; crutches ordered.  Recommend DC to home w/ assist as needed.      Time Calculation:   PT Charges     Row Name 06/03/19 2140             Time Calculation    Start Time  1333  -AR       Stop Time  1350  -AR      Time Calculation (min)  17 min  -AR      PT Received On  06/03/19  -AR      PT - Next Appointment  06/04/19  -AR      PT Goal Re-Cert Due Date  06/04/19  -AR        User Key  (r) = Recorded By, (t) = Taken By, (c) = Cosigned By    Initials Name Provider Type    AR Kita Raphael, PT Physical Therapist        Therapy Charges for Today     Code Description Service Date Service Provider Modifiers Qty    43662850369 HC PT EVAL LOW COMPLEXITY 2 6/3/2019 Kita Raphael, PT GP 1    26357152630 HC PT THER PROC EA 15 MIN 6/3/2019 Kita Raphael, PT GP 1                 Kita Raphael PT  6/3/2019

## 2019-06-03 NOTE — PROGRESS NOTES
"Pharmacy Consult - Zosyn Dosing     Rafy Oliveira has a consult for pharmacy to dose Zosyn for SSTI x7 days per Dr. Radford' request.     Relevant clinical data and objective history reviewed:  28 y.o. male 188 cm (74\") 99.8 kg (220 lb)    Past Medical History:   Diagnosis Date   • Environmental allergies      Creatinine   Date Value Ref Range Status   06/02/2019 1.09 0.76 - 1.27 mg/dL Final     BUN   Date Value Ref Range Status   06/02/2019 12 6 - 20 mg/dL Final     Estimated Creatinine Clearance: 128.4 mL/min (by C-G formula based on SCr of 1.09 mg/dL).    Lab Results   Component Value Date    WBC 22.51 (H) 06/02/2019     Temp Readings from Last 3 Encounters:   06/02/19 98.6 °F (37 °C) (Oral)   11/16/17 (!) 100.8 °F (38.2 °C)   02/05/17 98.1 °F (36.7 °C) (Oral)          Assessment/Plan  1. Patient received Zosyn 3.375g IV x1 this evening @ 1800. Will start Zosyn 3.375g IV q8h based on indication and patient parameters thereafter.     2. The Pharmacy to Dose Consult will be discontinued at this time. Renal function will continue to be monitored and dosing adjustments will be made by pharmacy if necessary.    Thank you for allowing me to participate in the care of your patient,    Je Castro, PharmD        "

## 2019-06-03 NOTE — PROGRESS NOTES
"Pharmacy to dose Vancomycin    Day 1 of 7  Consult for Dr. Flores  Treating: R ankle cellulitis after puncture wound  Goal trough: 15-20 mcg/mL (as noted by ID)  Other antimicrobials: Ciprofloxacin 750 mg PO BID  Current ordered antibiotics DOT: 7 days, stop date 6/10/19    Pt  has a past medical history of Environmental allergies.    Anti-Infectives (From admission, onward)    Ordered     Dose/Rate Route Frequency Start Stop    06/03/19 1040  vancomycin 2000 mg/500 mL 0.9% NS IVPB ([facility])     Ordering Provider:  Steven Flores MD    20 mg/kg × 99.8 kg  over 200 Minutes Intravenous Once 06/03/19 1130      06/03/19 1034  ciprofloxacin (CIPRO) tablet 750 mg     Ordering Provider:  Steven Flores MD    750 mg Oral Every 12 Hours Scheduled 06/03/19 1115 06/10/19 0859    06/03/19 1035  Pharmacy to dose vancomycin     Ordering Provider:  Steven Flores MD     Does not apply Continuous PRN 06/03/19 1035 06/10/19 1034    06/02/19 1716  piperacillin-tazobactam (ZOSYN) 3.375 g in iso-osmotic dextrose 50 ml (premix)     Ordering Provider:  Julieth Snáchez APRN    3.375 g Intravenous Once 06/02/19 1718 06/02/19 1900           Relevant clinical data and objective history reviewed:  28 y.o. male 188 cm (74\") 99.8 kg (220 lb)  Body mass index is 28.25 kg/m².  Results from last 7 days   Lab Units 06/03/19  0542 06/02/19  1745   CREATININE mg/dL 1.10 1.09     Estimated Creatinine Clearance: 127.3 mL/min (by C-G formula based on SCr of 1.1 mg/dL).    Lab Results   Component Value Date    WBC 16.62 (H) 06/03/2019    WBC 22.51 (H) 06/02/2019     Temp Readings from Last 3 Encounters:   06/03/19 97.5 °F (36.4 °C) (Oral)   11/16/17 (!) 100.8 °F (38.2 °C)   02/05/17 98.1 °F (36.7 °C) (Oral)        No results found for: VANCOTROUGH  No results found for: GERARDO    Baseline cultures:  6/02 BCx X 2: In process    Labs:  6/03 CRP: 12.11    Assessment/Plan:   1. Based on pt's current renal " Spoke with daughter Jess   Informed that we have not yet received the results for pts biopsy/patholgy report. Stated that we have tried contacting Dr Torres office to req the report but nothing received at this time. Suggested she contact Dr Torres office herself. Jess informed she tried contacting their office 4x and no success. She informed she will try and contact the hospital as to where it was performed to get the report. Informed to verify that she is on her HIPAA as well as they will not relay the report to her. Daughter understood and no further questions    function, young age, and weight (~100 kg), will give vancomycin loading dose of 2000 mg (20 mg/kg) IV X 1 now, followed by Vancomycin 1000 mg IV every 8 hours (10 mg/kg)  2. Will order vancomycin trough 30 minutes prior to the 4th dose @ 1030 on 6/4 to monitor therapeutic drug concentration. (trough goal of 15-20)  3. Will keep monitoring pt's renal function every 24 hours for the first 3 days and then at least every 48 hours per [facility]'s dosing recommendations.   4. Encourage hydration to help prevent toxic accumulation; monitor for s/sxn of toxicity including increase in SCr and decrease in UOP.    Pharmacy will continue to follow daily while on vancomycin and adjust as needed. Thank you for allowing us to participate in this patient's care.    Holly Dunne, PharmD, BCPS  06/03/19 10:41 AM

## 2019-06-03 NOTE — CONSULTS
Referring Provider: Soy Radford MD  [facility] [address] [address]    Reason for Consultation: R ankle cellulitis    History of present illness:  Rafy is a 28 YOM who I am asked to evaluate and give opinion for R ankle cellulitis. History is obtained from the patient, his mother, Dr Reeves, and review of the old medical records which I summarize/synthesize as follows: On Saturday 6/1/19 he was playing volleyball and suffered a puncture wound when fetching the ball in water. He believes it was a nail from a board that punctured the skin near the medial malleolous. He went to urgent care where he received topical wound cleaning and RX for amoxicillin. However, that night he had gradual onset swelling, erythema, and sharp constant pain. He had associated chills. The wound was draining some purulent material. There were no alleviating factors. He came to the ER and was found to have a leukocytosis. He was started on vancomycin and Zosyn. His WBC is improved this AM. MRI of the ankle has been ordered. He denies a prior history of skin and soft tissue infections. His last tetanus shot was in 7/2016 and is documented in Epic.    Past Medical History:   Diagnosis Date   • Environmental allergies        Past Surgical History:   Procedure Laterality Date   • HAND SURGERY     • HAND SURGERY Left     titanium luz and 5 screws put in.       Social History:  Works for [employer]  No tobacco or illicits    Family History:  Mom: living and healthy    Allergies:  No Antibiotic Allergies    Medications:    Current Facility-Administered Medications:   •  acetaminophen (TYLENOL) tablet 650 mg, 650 mg, Oral, Q4H PRN, Soy Radford MD  •  bisacodyl (DULCOLAX) EC tablet 5 mg, 5 mg, Oral, Daily PRN, Soy Radford MD  •  bisacodyl (DULCOLAX) suppository 10 mg, 10 mg, Rectal, Daily PRN, Soy Radford MD  •  ciprofloxacin (CIPRO) tablet 500 mg, 500 mg, Oral, Q12H, Steven Flores MD  •   enoxaparin (LOVENOX) syringe 40 mg, 40 mg, Subcutaneous, Q24H, Soy Radford MD, 40 mg at 06/02/19 2004  •  famotidine (PEPCID) tablet 20 mg, 20 mg, Oral, Daily, Soy Radford MD, 20 mg at 06/03/19 0819  •  HYDROcodone-acetaminophen (NORCO)  MG per tablet 1 tablet, 1 tablet, Oral, Q6H PRN, Soy Radford MD, 1 tablet at 06/03/19 0400  •  ibuprofen (ADVIL,MOTRIN) tablet 600 mg, 600 mg, Oral, Q6H PRN, Soy Radford MD  •  ondansetron (ZOFRAN) tablet 4 mg, 4 mg, Oral, Q6H PRN **OR** ondansetron (ZOFRAN) injection 4 mg, 4 mg, Intravenous, Q6H PRN, Soy Radford MD  •  [COMPLETED] Insert peripheral IV, , , Once **AND** sodium chloride 0.9 % flush 10 mL, 10 mL, Intravenous, PRN, Julieth Sánchez APRN  •  sodium chloride 0.9 % flush 3 mL, 3 mL, Intravenous, Q12H, Syo Radford MD, 3 mL at 06/02/19 2129  •  sodium chloride 0.9 % flush 3-10 mL, 3-10 mL, Intravenous, PRN, Soy Radford MD    Review of Systems  All systems were reviewed and are negative unless otherwise stated above in the HPI    Objective   Vital Signs   Temp:  [97.1 °F (36.2 °C)-99.3 °F (37.4 °C)] 97.5 °F (36.4 °C)  Heart Rate:  [69-88] 69  Resp:  [15-16] 16  BP: (101-156)/(50-78) 132/73    Physical Exam:   General: awake, alert, NAD   Head: Normocephalic  Eyes: EOMI, no scleral icterus  ENT: MMM, Good dentition.   Neck: Supple  Cardiovascular: NR, RR, no murmurs, trace RLE edema  Respiratory: Lungs are clear to auscultation bilaterally, no rales or wheezing; normal work of breathing on ambient air  GI: Abdomen is soft, non-tender, non-distended  : no Yen catheter present  Musculoskeletal: R ankle edema  Skin: 5 x 5 mm scab near R medial malleolus without drainage; there is surrounding heat and erythema  Neurological: Alert and oriented x 3,motor strength 5/5 in upper extremities  Psychiatric: Normal mood and affect   Vasc: no cyanosis; PIV w/o erythema    Labs:     Lab Results   Component  Value Date    WBC 16.62 (H) 06/03/2019    HGB 13.5 06/03/2019    HCT 40.7 06/03/2019    MCV 96.4 06/03/2019     06/03/2019       Lab Results   Component Value Date    GLUCOSE 102 (H) 06/03/2019    BUN 12 06/03/2019    CREATININE 1.10 06/03/2019    EGFRIFNONA 80 06/03/2019    BCR 10.9 06/03/2019    CO2 25.2 06/03/2019    CALCIUM 8.9 06/03/2019    ALBUMIN 4.60 06/02/2019    AST 29 06/02/2019    ALT 30 06/02/2019     Lab Results   Component Value Date    CRP 12.11 (H) 06/03/2019     No results found for: CAROLINE CHANCE VANCORANDOM    Microbiology:  6/2/19 BCx: NGTD    Radiology (personally reviewed report):  XR shows no fracture or foreign body  MRI pending    Assessment/Plan   1. Right ankle cellulitis after puncture wound and water exposure  -no evidence of necrotizing fasciitis  -up to date on tetanus shot (7/2016)  -agree with MRI to evaluate for deep infection that could require I&D  -continue vancomycin with goal trough 15-20  -stop Zosyn  -start ciprofloxacin 750 mg PO BID  -duration of antibiotics TBD; will devise all oral regimen at discharge  -I added on a CRP to morning labs  -will order a wound culture to be on standby for use in case purulent drainage returns  -continue leg elevation      Thank you for this consult. ID will follow.

## 2019-06-03 NOTE — PROGRESS NOTES
[facility]     LOS: 1 day     Name: Rafy Oliveira  Age: 28 y.o.  Sex: male  :  1990  MRN: [id number]         Primary Care Physician: Provider, No Known    Diet Regular    Subjective   generally he feels better but states erythema is worse medial ankle and new on lateral foot but erythema has improved in calf medially. no drainage from wound. pain is worse and severe when he tries to ambulate. appetite is fine.    Review of Systems   Respiratory: Negative for shortness of breath.    Cardiovascular: Negative for chest pain.   Skin: Positive for color change and wound.     Objective   Temp:  [97.1 °F (36.2 °C)-99.3 °F (37.4 °C)] 97.5 °F (36.4 °C)  Heart Rate:  [69-88] 69  Resp:  [15-16] 16  BP: (101-156)/(50-78) 132/73  SpO2:  [95 %-100 %] 99 %  on   ;   Device (Oxygen Therapy): room air  Body mass index is 28.25 kg/m².    Physical Exam   Constitutional: He is oriented to person, place, and time. No distress.   Cardiovascular: Normal rate and regular rhythm.   Pulmonary/Chest: Effort normal and breath sounds normal. No respiratory distress.   Abdominal: Soft. Bowel sounds are normal. There is no tenderness. There is no rebound and no guarding.   Musculoskeletal: He exhibits edema.   right ankle swollen and tender   Neurological: He is alert and oriented to person, place, and time.   Skin: Skin is warm and dry. There is erythema (right ankle).   see photographic images in chart   Psychiatric: He has a normal mood and affect. His behavior is normal.     Reviewed medications and new clinical results    enoxaparin 40 mg Subcutaneous Q24H   famotidine 20 mg Oral Daily   piperacillin-tazobactam 3.375 g Intravenous Q8H   sodium chloride 3 mL Intravenous Q12H      Results from last 7 days   Lab Units 19  0542 19  1746   WBC 10*3/mm3 16.62* 22.51*   HEMOGLOBIN g/dL 13.5 14.1   PLATELETS 10*3/mm3 218 235     Results from last 7 days   Lab Units  06/03/19  0542 06/02/19  1745   SODIUM mmol/L 137 136   POTASSIUM mmol/L 4.2 3.8   CHLORIDE mmol/L 100 97*   CO2 mmol/L 25.2 24.8   BUN mg/dL 12 12   CREATININE mg/dL 1.10 1.09   CALCIUM mg/dL 8.9 9.4   GLUCOSE mg/dL 102* 107*     Lab Results   Component Value Date    ANIONGAP 11.8 06/03/2019     Estimated Creatinine Clearance: 127.3 mL/min (by C-G formula based on SCr of 1.1 mg/dL).    I personally reviewed right ankle XR    Assessment/Plan   Active Hospital Problems    Diagnosis  POA   • Puncture wound of right ankle [S91.031A]  Yes   • Cellulitis [L03.90]  Yes      Resolved Hospital Problems   No resolved problems to display.     28 y.o. male right ankle puncture and cellulitis    · right ankle cellulitis after puncture injury  · WBC improved, no fever  · check MRI evaluate for deeper involvement  · ID consult  · disposition to be determined  · discussed with patient, family and nursing staff    Killian Reeves MD   06/03/19  9:08 AM

## 2019-06-03 NOTE — PLAN OF CARE
Problem: Patient Care Overview  Goal: Plan of Care Review   06/03/19 7091   Coping/Psychosocial   Plan of Care Reviewed With patient   OTHER   Outcome Summary Puncture wound on R ankle w/ infection limiting tolerance to weight bearing, MRI results pending. Pt indepnedent prior to admission, playing volleyball at time of injury. Practiced gait w/ RW and crutches, increased independence using crutches ambulating in [place], no safety concerns; crutches ordered. Recommend DC to home w/ assist as needed.

## 2019-06-04 VITALS
DIASTOLIC BLOOD PRESSURE: 62 MMHG | WEIGHT: 220 LBS | BODY MASS INDEX: 28.23 KG/M2 | HEART RATE: 64 BPM | SYSTOLIC BLOOD PRESSURE: 117 MMHG | RESPIRATION RATE: 16 BRPM | OXYGEN SATURATION: 97 % | HEIGHT: 74 IN | TEMPERATURE: 97.6 F

## 2019-06-04 LAB
CREAT BLD-MCNC: 1.09 MG/DL (ref 0.76–1.27)
DEPRECATED RDW RBC AUTO: 51.1 FL (ref 37–54)
ERYTHROCYTE [DISTWIDTH] IN BLOOD BY AUTOMATED COUNT: 14.1 % (ref 12.3–15.4)
GFR SERPL CREATININE-BSD FRML MDRD: 81 ML/MIN/1.73
HCT VFR BLD AUTO: 39.2 % (ref 37.5–51)
HGB BLD-MCNC: 12.8 G/DL (ref 13–17.7)
MCH RBC QN AUTO: 31.8 PG (ref 26.6–33)
MCHC RBC AUTO-ENTMCNC: 32.7 G/DL (ref 31.5–35.7)
MCV RBC AUTO: 97.5 FL (ref 79–97)
PLATELET # BLD AUTO: 210 10*3/MM3 (ref 140–450)
PMV BLD AUTO: 10 FL (ref 6–12)
RBC # BLD AUTO: 4.02 10*6/MM3 (ref 4.14–5.8)
WBC NRBC COR # BLD: 11.19 10*3/MM3 (ref 3.4–10.8)

## 2019-06-04 PROCEDURE — 25010000002 VANCOMYCIN PER 500 MG: Performed by: INTERNAL MEDICINE

## 2019-06-04 PROCEDURE — 87186 SC STD MICRODIL/AGAR DIL: CPT | Performed by: INTERNAL MEDICINE

## 2019-06-04 PROCEDURE — 87070 CULTURE OTHR SPECIMN AEROBIC: CPT | Performed by: INTERNAL MEDICINE

## 2019-06-04 PROCEDURE — 99232 SBSQ HOSP IP/OBS MODERATE 35: CPT | Performed by: INTERNAL MEDICINE

## 2019-06-04 PROCEDURE — 87205 SMEAR GRAM STAIN: CPT | Performed by: INTERNAL MEDICINE

## 2019-06-04 PROCEDURE — 87077 CULTURE AEROBIC IDENTIFY: CPT | Performed by: INTERNAL MEDICINE

## 2019-06-04 PROCEDURE — 85027 COMPLETE CBC AUTOMATED: CPT | Performed by: INTERNAL MEDICINE

## 2019-06-04 PROCEDURE — 82565 ASSAY OF CREATININE: CPT | Performed by: INTERNAL MEDICINE

## 2019-06-04 RX ORDER — SULFAMETHOXAZOLE AND TRIMETHOPRIM 800; 160 MG/1; MG/1
2 TABLET ORAL EVERY 12 HOURS SCHEDULED
Qty: 28 TABLET | Refills: 0 | Status: SHIPPED | OUTPATIENT
Start: 2019-06-04 | End: 2019-06-11

## 2019-06-04 RX ORDER — SULFAMETHOXAZOLE AND TRIMETHOPRIM 800; 160 MG/1; MG/1
2 TABLET ORAL EVERY 12 HOURS SCHEDULED
Status: DISCONTINUED | OUTPATIENT
Start: 2019-06-04 | End: 2019-06-04 | Stop reason: HOSPADM

## 2019-06-04 RX ORDER — HYDROCODONE BITARTRATE AND ACETAMINOPHEN 10; 325 MG/1; MG/1
1 TABLET ORAL EVERY 6 HOURS PRN
Qty: 12 TABLET | Refills: 0 | Status: SHIPPED | OUTPATIENT
Start: 2019-06-04 | End: 2019-06-11

## 2019-06-04 RX ORDER — LEVOFLOXACIN 750 MG/1
750 TABLET ORAL EVERY 24 HOURS
Qty: 7 TABLET | Refills: 0 | Status: SHIPPED | OUTPATIENT
Start: 2019-06-04 | End: 2019-06-11

## 2019-06-04 RX ORDER — LEVOFLOXACIN 750 MG/1
750 TABLET ORAL EVERY 24 HOURS
Status: DISCONTINUED | OUTPATIENT
Start: 2019-06-04 | End: 2019-06-04 | Stop reason: HOSPADM

## 2019-06-04 RX ADMIN — HYDROCODONE BITARTRATE AND ACETAMINOPHEN 1 TABLET: 10; 325 TABLET ORAL at 00:03

## 2019-06-04 RX ADMIN — POLYETHYLENE GLYCOL 3350 17 G: 17 POWDER, FOR SOLUTION ORAL at 08:58

## 2019-06-04 RX ADMIN — VANCOMYCIN HYDROCHLORIDE 1000 MG: 1 INJECTION, SOLUTION INTRAVENOUS at 05:00

## 2019-06-04 RX ADMIN — FAMOTIDINE 20 MG: 20 TABLET, FILM COATED ORAL at 08:58

## 2019-06-04 RX ADMIN — CIPROFLOXACIN HYDROCHLORIDE 750 MG: 750 TABLET, FILM COATED ORAL at 08:58

## 2019-06-04 RX ADMIN — IBUPROFEN 600 MG: 600 TABLET ORAL at 11:14

## 2019-06-04 RX ADMIN — ONDANSETRON 4 MG: 4 TABLET, FILM COATED ORAL at 11:14

## 2019-06-04 NOTE — PROGRESS NOTES
LOS: 2 days     Chief Complaint:  Follow-up R ankle cellulitis    Interval History:  No acute events. Sharp constant pain is slightly improved. He is able to take a few steps though this does exacerbate the pain. Pain is relived w/ ice and rest.  He still has erythema and heat. MRI was negative for deep infection. No rash or diarrhea on antibiotics. He is eager for discharge.     ROS: no n/v/d    Vital Signs  Temp:  [97.5 °F (36.4 °C)-99.1 °F (37.3 °C)] 98.2 °F (36.8 °C)  Heart Rate:  [60-74] 62  Resp:  [16] 16  BP: (112-138)/(54-77) 128/71    Physical Exam:  General: awake, alert, NAD   Head: Normocephalic  Eyes: no scleral icterus  ENT: MMM, Good dentition.   Neck: Supple  Cardiovascular: NR,  trace RLE edema  Respiratory: normal work of breathing on ambient air  GI: Abdomen is soft, non-tender, non-distended  : no Yen catheter present  Musculoskeletal: R ankle edema  Skin: 5 x 5 mm scab near R medial malleolus without drainage; there is surrounding heat and erythema  Neurological: Alert and oriented x 3,motor strength 5/5 in upper extremities  Psychiatric: Normal mood and affect     Meds:    Current Facility-Administered Medications:   •  acetaminophen (TYLENOL) tablet 650 mg, 650 mg, Oral, Q4H PRN, Soy Radford MD  •  bisacodyl (DULCOLAX) EC tablet 5 mg, 5 mg, Oral, Daily PRN, Soy Radford MD  •  bisacodyl (DULCOLAX) suppository 10 mg, 10 mg, Rectal, Daily PRN, Soy Radford MD  •  enoxaparin (LOVENOX) syringe 40 mg, 40 mg, Subcutaneous, Q24H, Soy Radford MD, 40 mg at 06/03/19 2122  •  famotidine (PEPCID) tablet 20 mg, 20 mg, Oral, Daily, Soy Radford MD, 20 mg at 06/04/19 0858  •  HYDROcodone-acetaminophen (NORCO)  MG per tablet 1 tablet, 1 tablet, Oral, Q6H PRN, Soy Radford MD, 1 tablet at 06/04/19 0003  •  ibuprofen (ADVIL,MOTRIN) tablet 600 mg, 600 mg, Oral, Q6H PRN, Soy Radford MD  •  levoFLOXacin (LEVAQUIN) tablet 750 mg, 750 mg, Oral,  Q24H, Steven Flores MD  •  ondansetron (ZOFRAN) tablet 4 mg, 4 mg, Oral, Q6H PRN **OR** ondansetron (ZOFRAN) injection 4 mg, 4 mg, Intravenous, Q6H PRN, Soy Radford MD  •  polyethylene glycol 3350 powder (packet), 17 g, Oral, BID, Killian Reeves MD, 17 g at 06/04/19 0858  •  [COMPLETED] Insert peripheral IV, , , Once **AND** sodium chloride 0.9 % flush 10 mL, 10 mL, Intravenous, PRN, Julieth Sánchez APRN  •  sodium chloride 0.9 % flush 3 mL, 3 mL, Intravenous, Q12H, Soy Radford MD, 3 mL at 06/03/19 0830  •  sodium chloride 0.9 % flush 3-10 mL, 3-10 mL, Intravenous, PRN, Soy Radford MD  •  sulfamethoxazole-trimethoprim (BACTRIM DS,SEPTRA DS) 800-160 MG per tablet 320 mg, 2 tablet, Oral, Q12H, Steven Flores MD    LABS:  CBC, Crt, CRP, micro reviewed today  Lab Results   Component Value Date    WBC 11.19 (H) 06/04/2019    HGB 12.8 (L) 06/04/2019    HCT 39.2 06/04/2019    MCV 97.5 (H) 06/04/2019     06/04/2019     Lab Results   Component Value Date    GLUCOSE 102 (H) 06/03/2019    BUN 12 06/03/2019    CREATININE 1.09 06/04/2019    EGFRIFNONA 81 06/04/2019    BCR 10.9 06/03/2019    CO2 25.2 06/03/2019    CALCIUM 8.9 06/03/2019    ALBUMIN 4.60 06/02/2019    AST 29 06/02/2019    ALT 30 06/02/2019    CRP 12.11 (H) 06/03/2019     Microbiology:  6/2/19 BCx: NGTD  6/4 WCx: ordered    Radiology (personally reviewed report):   MRI R ankle negative for osteomyelitis, abscess, and tenosynovitis    Assessment/Plan   1. Right ankle cellulitis after puncture wound and water exposure  -no evidence of necrotizing fasciitis  -up to date on tetanus shot (7/2016)  -MRI negative for deep infection; WBC normalizing, no fevers, and BCx negative; exam is stable  -he is eager for discharge, and I think it is appropriate at this time since I can select an oral regimen and offer close follow-up  -wound culture today for some scant drainage that is present  -stop vancomycin  and ciprofloxacin  -start Bactrim DS 2 tabs BID and levofloxacin 750 mg PO q24h; this will provide, MSSA, MRSA, Strep, and GNR coverage to cover common skin organisms but also take into consideration his water exposure  -follow-up with me 6/11/19 at 11:20 AM; he will call me if worsening symptoms in the interim  -continue icing and leg elevation        Thank you for allowing me to be involved in the care of this patient. Infectious diseases will sign off at this time with antibiotics plan in place but please call me at [phone] if any further questions. Plan d/w Dr Reeves.

## 2019-06-04 NOTE — SIGNIFICANT NOTE
06/04/19 1134   Rehab Treatment   Discipline physical therapy assistant   Reason Treatment Not Performed patient/family declined treatment  (pt getting ready to d/c; declined education/demo on stair training)

## 2019-06-04 NOTE — DISCHARGE INSTR - ACTIVITY
Keep wound covered with clean dressing as long as there is drainage. If symptoms worsen, call Dr. Flores's office. Otherwise, follow up with Dr. Flores on June 11th.

## 2019-06-04 NOTE — PLAN OF CARE
Problem: Patient Care Overview  Goal: Plan of Care Review  Outcome: Ongoing (interventions implemented as appropriate)   06/04/19 0123   Coping/Psychosocial   Plan of Care Reviewed With patient   Plan of Care Review   Progress improving   OTHER   Outcome Summary Pt admitted with a diagnosis of cellulitis after an injury with a nail to the right foot. Small puncture site to the rt inner ankle. Covered with gauze and Keflex. Minimal amount of drainage noted. Pt continues on IV Vanc and PO Cipro. No adverse effects noted. Pt has been ambualting in the room with crutches. Continues with PO pain meds that provide relief. Pt is resting at this time, will continue to monitor.      Goal: Individualization and Mutuality  Outcome: Ongoing (interventions implemented as appropriate)    Goal: Discharge Needs Assessment  Outcome: Ongoing (interventions implemented as appropriate)    Goal: Interprofessional Rounds/Family Conf  Outcome: Ongoing (interventions implemented as appropriate)      Problem: Fall Risk (Adult)  Goal: Identify Related Risk Factors and Signs and Symptoms  Outcome: Ongoing (interventions implemented as appropriate)    Goal: Absence of Fall  Outcome: Ongoing (interventions implemented as appropriate)      Problem: Infection, Risk/Actual (Adult)  Goal: Identify Related Risk Factors and Signs and Symptoms  Outcome: Ongoing (interventions implemented as appropriate)    Goal: Infection Prevention/Resolution  Outcome: Ongoing (interventions implemented as appropriate)      Problem: Hypertensive Disease/Crisis (Arterial) (Adult)  Goal: Signs and Symptoms of Listed Potential Problems Will be Absent, Minimized or Managed (Hypertensive Disease/Crisis)  Outcome: Ongoing (interventions implemented as appropriate)

## 2019-06-04 NOTE — DISCHARGE SUMMARY
[facility]    Date of Discharge:  6/4/2019    PCP: Provider, No Known    Discharge Diagnosis:   Active Hospital Problems    Diagnosis  POA   • Puncture wound of right ankle [S91.031A]  Yes   • Cellulitis [L03.90]  Yes      Resolved Hospital Problems   No resolved problems to display.      Consults     Date and Time Order Name Status Description    6/3/2019 1006 Inpatient Infectious Diseases Consult Completed     6/2/2019 1856 [facility] (on-call MD unless specified) Completed         Hospital Course  Please see history and physical for details. Patient is a 28 y.o. male admitted with right ankle cellulitis after puncture wound and water exposure. He was treated with antibiotics under the direction of infectious diseases. There was no evidence of necrotizing fasciitis and he is up to date on tetanus.  An MRI was negative for deep infection.  Leukocytosis is near normal patient would like to go home and ID recommends Bactrim DS 2 tablets twice daily and levofloxacin 750 mg daily which would provide MSSA, MRSA, strep and gram-negative coverage covering common skin organisms as well as water exposure.  He will follow-up with infectious diseases next week and will continue leg elevation and icing.  He plans to mostly take Advil for pain.  Provided prescription for hydrocodone/acetaminophen if he needs it.    I discussed the patient's findings and my recommendations with patient and nursing staff.    Condition on Discharge: Improved.     Temp:  [97.5 °F (36.4 °C)-99.1 °F (37.3 °C)] 98.2 °F (36.8 °C)  Heart Rate:  [60-74] 62  Resp:  [16] 16  BP: (112-138)/(54-77) 128/71  Body mass index is 28.25 kg/m².    Physical Exam   Constitutional: He is oriented to person, place, and time. No distress.   Cardiovascular: Normal rate and regular rhythm.   Pulmonary/Chest: Effort normal. No respiratory distress.   Neurological: He is alert and oriented to person, place, and time.   Skin:  Skin is warm and dry. There is erythema (see image in progress note today).   Psychiatric: He has a normal mood and affect. His behavior is normal.        Discharge Medications      New Medications      Instructions Start Date   HYDROcodone-acetaminophen  MG per tablet  Commonly known as:  NORCO   1 tablet, Oral, Every 6 Hours PRN      levoFLOXacin 750 MG tablet  Commonly known as:  LEVAQUIN   750 mg, Oral, Every 24 Hours      polyethylene glycol pack packet  Commonly known as:  MIRALAX   17 g, Oral, 2 Times Daily, take while on pain medication. can get over the counter      sulfamethoxazole-trimethoprim 800-160 MG per tablet  Commonly known as:  BACTRIM DS,SEPTRA DS   2 tablets, Oral, Every 12 Hours Scheduled         Continue These Medications      Instructions Start Date   cetirizine 10 MG tablet  Commonly known as:  zyrTEC   10 mg, Oral, Daily      CONCERTA 36 MG CR tablet  Generic drug:  methylphenidate   36 mg, Oral, Every Morning      hydrocortisone 25 MG suppository  Commonly known as:  ANUSOL-HC   25 mg, Rectal, Nightly         Stop These Medications    amoxicillin-clavulanate 875-125 MG per tablet  Commonly known as:  AUGMENTIN     AUGMENTIN 500-125 MG per tablet  Generic drug:  amoxicillin-clavulanate           Diet Instructions     Diet: Regular      Discharge Diet:  Regular         Activity Instructions     Activity as Tolerated           Additional Instructions for the Follow-ups that You Need to Schedule     Call MD for problems / concerns.   As directed        Follow-up Information     Steven Flores MD Follow up on 6/11/2019.    Specialty:  Infectious Diseases  Contact information:  [address] [place], [place] [id number] [phone]             Provider, No Known .    Contact information:  [facility] [id number]                 Test Results Pending at Discharge   Order Current Status    Wound Culture - Wound, Ankle, Right Collected (06/04/19 0971)     Blood Culture - Blood, Hand, Left Preliminary result    Blood Culture - Blood, Hand, Left Preliminary result         Killian Reeves MD  06/04/19  10:23 AM

## 2019-06-07 LAB
BACTERIA SPEC AEROBE CULT: NORMAL
BACTERIA SPEC AEROBE CULT: NORMAL

## 2019-06-08 LAB
BACTERIA SPEC AEROBE CULT: ABNORMAL
GRAM STN SPEC: ABNORMAL
GRAM STN SPEC: ABNORMAL

## 2019-06-11 ENCOUNTER — OFFICE VISIT (OUTPATIENT)
Dept: INFECTIOUS DISEASES | Facility: CLINIC | Age: 29
End: 2019-06-11

## 2019-06-11 VITALS
HEART RATE: 60 BPM | TEMPERATURE: 97.7 F | SYSTOLIC BLOOD PRESSURE: 156 MMHG | HEIGHT: 74 IN | DIASTOLIC BLOOD PRESSURE: 74 MMHG | BODY MASS INDEX: 28.23 KG/M2 | WEIGHT: 220 LBS

## 2019-06-11 DIAGNOSIS — A49.9 GRAM-NEGATIVE INFECTION: ICD-10-CM

## 2019-06-11 DIAGNOSIS — L03.115 CELLULITIS OF RIGHT LOWER EXTREMITY: Primary | ICD-10-CM

## 2019-06-11 PROCEDURE — 99213 OFFICE O/P EST LOW 20 MIN: CPT | Performed by: INTERNAL MEDICINE

## 2019-06-11 NOTE — PROGRESS NOTES
"CC: f/u R ankle cellulitis after water/nail exposure    HPI: Rafy Oliveira is a 28 y.o. male here for f/u R ankle cellulitis after water/nail exposure. R ankle swelling is improved. Heat is resolved. Bruising persists. He is able to walk on it now and the sharp intermittent pain is better each day. He did have some nausea with antibiotics which he finished yesterday and the nausea is now resolved. He is trying to elevate and ice the ankle as much as possible but hasn't done so the past two days.      Review of Systems: no rash or chest pain    Past medical history:  RLE cellulitis secondary to water/nail exposure    Medications:   Current Outpatient Medications:   •  cetirizine (ZyrTEC) 10 MG tablet, Take 1 tablet by mouth daily., Disp: 30 tablet, Rfl: 0  •  levoFLOXacin (LEVAQUIN) 750 MG tablet, Take 1 tablet by mouth Daily for 7 doses., Disp: 7 tablet, Rfl: 0  •  methylphenidate (CONCERTA) 36 MG CR tablet, Take 36 mg by mouth every morning., Disp: , Rfl:   •  sulfamethoxazole-trimethoprim (BACTRIM DS,SEPTRA DS) 800-160 MG per tablet, Take 2 tablets by mouth Every 12 (Twelve) Hours for 14 doses., Disp: 28 tablet, Rfl: 0      OBJECTIVE:  Blood pressure 156/74, pulse 60, temperature 97.7 °F (36.5 °C), height 188 cm (74.02\"), weight 99.8 kg (220 lb).    General: awake, alert, NAD   Head: Normocephalic  Eyes: no scleral icterus  ENT: MMM, Good dentition.   Cardiovascular: NR, trace RLE edema  Respiratory: normal work of breathing on ambient air  GI: Abdomen is non-distended  Musculoskeletal: R ankle bruising  Skin: 5 x 5 mm scab near R medial malleolus without drainage; there is surrounding bruising; the heat and erythema are resolved    DIAGNOSTICS:  Micro reviewed today  Lab Results   Component Value Date    WBC 11.19 (H) 06/04/2019    HGB 12.8 (L) 06/04/2019    HCT 39.2 06/04/2019     06/04/2019     Lab Results   Component Value Date    CRP 12.11 (H) 06/03/2019       Lab Results   Component Value Date "    GLUCOSE 102 (H) 06/03/2019    BUN 12 06/03/2019    CREATININE 1.09 06/04/2019    EGFRIFNONA 81 06/04/2019    BCR 10.9 06/03/2019    CO2 25.2 06/03/2019    CALCIUM 8.9 06/03/2019    ALBUMIN 4.60 06/02/2019    AST 29 06/02/2019    ALT 30 06/02/2019     Microbiology:  6/2/19 BCx: negative  6/4 WCx: scant Enterobacter, scant Aeromonas, rare Morganella     Radiology (prior):  MRI R ankle negative for osteomyelitis, abscess, and tenosynovitis    ASSESSMENT/PLAN:  1. Right ankle cellulitis after nail puncture wound and water exposure (polymicrobial infection)  -no evidence of necrotizing fasciitis; MRI negative for deep infection  -up to date on tetanus shot (7/2016)  -good clinical response to 7 days of levofloxacin and Bactrim which treat all organisms grown  -encouraged leg elevation and icing  -if symptoms were to return or worsen, would repeat imaging and refer to ortho foot & ankle specialist but I think that is unlikely at this point    RTC as needed    I spent greater than 15 minutes of face-to-face time with patient and >50% counseling re: bacteria grown, treatments provided, signs and symptoms or recurrent infections, expectations for recovery.

## 2021-04-27 ENCOUNTER — OTHER (OUTPATIENT)
Dept: URBAN - METROPOLITAN AREA CLINIC 17 | Facility: CLINIC | Age: 31
Setting detail: DERMATOLOGY
End: 2021-04-27

## 2021-04-27 DIAGNOSIS — L23.9 ALLERGIC CONTACT DERMATITIS, UNSPECIFIED CAUSE: ICD-10-CM

## 2021-04-27 DIAGNOSIS — L21.8 OTHER SEBORRHEIC DERMATITIS: ICD-10-CM

## 2021-04-27 DIAGNOSIS — Z79.899 OTHER LONG TERM (CURRENT) DRUG THERAPY: ICD-10-CM

## 2021-04-27 PROBLEM — L72.3 SEBACEOUS CYST: Status: RESOLVED | Noted: 2021-04-27

## 2021-04-27 PROCEDURE — 11102 TANGNTL BX SKIN SINGLE LES: CPT

## 2021-04-27 PROCEDURE — 99203 OFFICE O/P NEW LOW 30 MIN: CPT

## 2021-04-27 PROCEDURE — 11103 TANGNTL BX SKIN EA SEP/ADDL: CPT

## 2021-05-05 ENCOUNTER — RX ONLY (RX ONLY)
Age: 31
End: 2021-05-05

## 2021-05-05 RX ORDER — FLUOROURACIL 50 MG/G
CREAM TOPICAL
Qty: 40 | Refills: 0
Start: 2021-05-05

## 2022-10-06 ENCOUNTER — APPOINTMENT (OUTPATIENT)
Dept: URBAN - METROPOLITAN AREA CLINIC 191 | Age: 32
Setting detail: DERMATOLOGY
End: 2022-10-30

## 2022-10-06 DIAGNOSIS — B07.8 OTHER VIRAL WARTS: ICD-10-CM

## 2022-10-06 DIAGNOSIS — Z85.828 PERSONAL HISTORY OF OTHER MALIGNANT NEOPLASM OF SKIN: ICD-10-CM

## 2022-10-06 DIAGNOSIS — L81.4 OTHER MELANIN HYPERPIGMENTATION: ICD-10-CM

## 2022-10-06 DIAGNOSIS — D17 BENIGN LIPOMATOUS NEOPLASM: ICD-10-CM

## 2022-10-06 DIAGNOSIS — L72.8 OTHER FOLLICULAR CYSTS OF THE SKIN AND SUBCUTANEOUS TISSUE: ICD-10-CM

## 2022-10-06 DIAGNOSIS — B35.3 TINEA PEDIS: ICD-10-CM

## 2022-10-06 DIAGNOSIS — L21.8 OTHER SEBORRHEIC DERMATITIS: ICD-10-CM

## 2022-10-06 DIAGNOSIS — L57.0 ACTINIC KERATOSIS: ICD-10-CM

## 2022-10-06 PROBLEM — D17.1 BENIGN LIPOMATOUS NEOPLASM OF SKIN AND SUBCUTANEOUS TISSUE OF TRUNK: Status: ACTIVE | Noted: 2022-10-06

## 2022-10-06 PROBLEM — D23.4 OTHER BENIGN NEOPLASM OF SKIN OF SCALP AND NECK: Status: ACTIVE | Noted: 2022-10-06

## 2022-10-06 PROCEDURE — 17000 DESTRUCT PREMALG LESION: CPT | Mod: 59

## 2022-10-06 PROCEDURE — OTHER PRESCRIPTION MEDICATION MANAGEMENT: OTHER

## 2022-10-06 PROCEDURE — OTHER INCISION AND DRAINAGE: OTHER

## 2022-10-06 PROCEDURE — OTHER COUNSELING: OTHER

## 2022-10-06 PROCEDURE — 99214 OFFICE O/P EST MOD 30 MIN: CPT | Mod: 25

## 2022-10-06 PROCEDURE — OTHER TREATMENT REGIMEN: OTHER

## 2022-10-06 PROCEDURE — 10060 I&D ABSCESS SIMPLE/SINGLE: CPT

## 2022-10-06 PROCEDURE — OTHER PRESCRIPTION: OTHER

## 2022-10-06 PROCEDURE — 17003 DESTRUCT PREMALG LES 2-14: CPT | Mod: 59

## 2022-10-06 PROCEDURE — 17110 DESTRUCT B9 LESION 1-14: CPT

## 2022-10-06 PROCEDURE — OTHER LIQUID NITROGEN: OTHER

## 2022-10-06 RX ORDER — HYDROCORTISONE 25 MG/G
APPLY CREAM TOPICAL BID PRN
Qty: 30 | Refills: 3 | Status: ERX | COMMUNITY
Start: 2022-10-06

## 2022-10-06 RX ORDER — KETOCONAZOLE 20 MG/ML
SHAMPOO, SUSPENSION TOPICAL
Qty: 120 | Refills: 3 | Status: ERX | COMMUNITY
Start: 2022-10-06

## 2022-10-06 RX ORDER — CICLOPIROX 7.7 MG/G
APPLY GEL TOPICAL
Qty: 100 | Refills: 3 | Status: ERX | COMMUNITY
Start: 2022-10-06

## 2022-10-06 ASSESSMENT — LOCATION ZONE DERM
LOCATION ZONE: TRUNK
LOCATION ZONE: TOE
LOCATION ZONE: FEET
LOCATION ZONE: FACE
LOCATION ZONE: LEG
LOCATION ZONE: ARM

## 2022-10-06 ASSESSMENT — LOCATION DETAILED DESCRIPTION DERM
LOCATION DETAILED: RIGHT MEDIAL INFERIOR CHEST
LOCATION DETAILED: LEFT DORSAL GREAT TOE
LOCATION DETAILED: LEFT DISTAL PRETIBIAL REGION
LOCATION DETAILED: RIGHT INFERIOR FOREHEAD
LOCATION DETAILED: LEFT PROXIMAL DORSAL FOREARM
LOCATION DETAILED: RIGHT FOREHEAD
LOCATION DETAILED: RIGHT INFERIOR LATERAL MIDBACK
LOCATION DETAILED: LEFT DORSAL FOOT
LOCATION DETAILED: LEFT SUPERIOR LATERAL MIDBACK
LOCATION DETAILED: LEFT FOREHEAD
LOCATION DETAILED: RIGHT CENTRAL ZYGOMA
LOCATION DETAILED: RIGHT PROXIMAL DORSAL FOREARM
LOCATION DETAILED: RIGHT VENTRAL PROXIMAL FOREARM
LOCATION DETAILED: LEFT LATERAL MALAR CHEEK
LOCATION DETAILED: SUPERIOR MID FOREHEAD
LOCATION DETAILED: LEFT MEDIAL DORSAL FOOT

## 2022-10-06 ASSESSMENT — LOCATION SIMPLE DESCRIPTION DERM
LOCATION SIMPLE: LEFT FOREARM
LOCATION SIMPLE: RIGHT LOWER BACK
LOCATION SIMPLE: RIGHT ZYGOMA
LOCATION SIMPLE: LEFT FOREHEAD
LOCATION SIMPLE: LEFT FOOT
LOCATION SIMPLE: SUPERIOR FOREHEAD
LOCATION SIMPLE: RIGHT FOREHEAD
LOCATION SIMPLE: LEFT PRETIBIAL REGION
LOCATION SIMPLE: RIGHT FOREARM
LOCATION SIMPLE: LEFT GREAT TOE
LOCATION SIMPLE: LEFT CHEEK
LOCATION SIMPLE: LEFT LOWER BACK
LOCATION SIMPLE: CHEST

## 2022-10-06 NOTE — PROCEDURE: LIQUID NITROGEN
Initial Anesthesia Post-op Note    Patient: Ana Baker  Procedure(s) Performed: RIGHT PHACO WITH IOL  Anesthesia type: Monitor Anesthesia Care    Vital Last Value   Temperature 36 °C (96.8 °F) (09/27/17 0641)   Pulse 68 (09/27/17 0641)   Respiratory Rate 18 (09/27/17 0641)   Non-Invasive   Blood Pressure 145/66 (09/27/17 0641)   Arterial  Blood Pressure     Pulse Oximetry 95 % (09/27/17 0641)     Last 24 I/O:   Intake/Output Summary (Last 24 hours) at 09/27/17 0853  Last data filed at 09/27/17 0852   Gross per 24 hour   Intake              450 ml   Output                0 ml   Net              450 ml       PATIENT LOCATION: Day Surgery  POST-OP VITAL SIGNS: stable  LEVEL OF CONSCIOUSNESS: participates in exam, awake, oriented, answers questions appropriately and alert  RESPIRATORY STATUS: spontaneous ventilation  CARDIOVASCULAR: blood pressure returned to baseline  HYDRATION: euvolemic    PAIN MANAGEMENT: well controlled  NAUSEA: None  AIRWAY PATENCY: patent  POST-OP ASSESSMENT: no complications, patient tolerated procedure well with no complications and sufficiently recovered from acute administration of anesthesia effects and able to participate in evaluation  COMPLICATIONS: none  Comments: Report given to RN. Vital signs noted. Patient stable. Care transferred to RN.  Pt to [facility] with RN, awake talking VSS      
Detail Level: Detailed
Render Post-Care Instructions In Note?: no
Consent: The patient's consent was obtained including but not limited to risks of crusting, scabbing, blistering, scarring, darker or lighter pigmentary change, recurrence, incomplete removal and infection.
Post-Care Instructions: I reviewed with the patient in detail post-care instructions. Patient is to wear sunprotection, and avoid picking at any of the treated lesions. Pt may apply Vaseline to crusted or scabbing areas.
Number Of Freeze-Thaw Cycles: 1 freeze-thaw cycle
Show Applicator Variable?: Yes
Duration Of Freeze Thaw-Cycle (Seconds): 0
Spray Paint Text: The liquid nitrogen was applied to the skin utilizing a spray paint frosting technique.
Medical Necessity Clause: This procedure was medically necessary because the lesions that were treated were:
Medical Necessity Information: It is in your best interest to select a reason for this procedure from the list below. All of these items fulfill various CMS LCD requirements except the new and changing color options.

## 2022-10-06 NOTE — PROCEDURE: COUNSELING
Detail Level: Zone
Detail Level: Detailed
Patient Specific Counseling (Will Not Stick From Patient To Patient): Consider Efudex after holidays\\nRecommenchrista Ruiz’s wide brim hat

## 2022-10-06 NOTE — PROCEDURE: PRESCRIPTION MEDICATION MANAGEMENT
Plan: OTC Z-Bar or DermaHarmony Zinc Bar - wash face/ears twice daily (available on Amazon)\\nHydrocortisone 2.5% cream - apply twice daily as needed for facial/ear scaling\\nKetoconazole shampoo 2-3 times weekly, rotate with OTC tea tree shampoo
Render In Strict Bullet Format?: No
Detail Level: Zone
Plan: Ciclopirox gel twice daily to feet/between toes as needed\\nDry feet/between toes with hair dryer after shower/bath

## 2022-10-06 NOTE — PROCEDURE: INCISION AND DRAINAGE
Detail Level: Detailed
Lesion Type: Cyst
Method: 11 blade
Curette: No
Anesthesia Type: 1% lidocaine with epinephrine
Anesthesia Volume In Cc: 0.5
Size Of Lesion In Cm (Optional But May Be Required For Some Insurances): 0
Drainage Type?: keratinaceous
Wound Care: Petrolatum
Dressing: pressure dressing
Epidermal Sutures: 4-0 Ethilon
Epidermal Closure: simple interrupted
Suture Text: The incision was partially closed with
Preparation Text: The area was prepped in the usual clean fashion.
Curette Text (Optional): After the contents were expressed a curette was used to partially remove the cyst wall.
Consent was obtained and risks were reviewed including but not limited to delayed wound healing, infection, need for multiple I and D's, and pain.
Post-Care Instructions: I reviewed with the patient in detail post-care instructions. Patient should keep wound covered and call the office should any redness, pain, swelling or worsening occur.

## 2022-10-06 NOTE — PROCEDURE: TREATMENT REGIMEN
Plan: Nicotinamide (also known as Niacinamide) 500 mg twice daily. Do not take Niacin which causes flushing.\\nCounseled re supplement and potential GI side effects. Patient is not taking a seizure medication and does not have low platelets.\\nRecommenAccess MediQuip [facility] [place] website for additional information regarding supplements\\nRecAxion BioSystemsded [email or web address] for information regarding specific brands of supplements Plan: Nicotinamide (also known as Niacinamide) 500 mg twice daily. Do not take Niacin which causes flushing.\\nCounseled re supplement and potential GI side effects. Patient is not taking a seizure medication and does not have low platelets.\\nRecommenAppy Hotel [facility] [place] website for additional information regarding supplements\\nRecArkansas Genomicsded [email or web address] for information regarding specific brands of supplements

## 2023-01-19 ENCOUNTER — APPOINTMENT (OUTPATIENT)
Dept: URBAN - METROPOLITAN AREA CLINIC 191 | Age: 33
Setting detail: DERMATOLOGY
End: 2023-02-01

## 2023-01-19 DIAGNOSIS — Z85.828 PERSONAL HISTORY OF OTHER MALIGNANT NEOPLASM OF SKIN: ICD-10-CM

## 2023-01-19 DIAGNOSIS — B07.8 OTHER VIRAL WARTS: ICD-10-CM

## 2023-01-19 DIAGNOSIS — L57.0 ACTINIC KERATOSIS: ICD-10-CM

## 2023-01-19 DIAGNOSIS — D17 BENIGN LIPOMATOUS NEOPLASM: ICD-10-CM

## 2023-01-19 DIAGNOSIS — L21.8 OTHER SEBORRHEIC DERMATITIS: ICD-10-CM

## 2023-01-19 PROBLEM — D23.4 OTHER BENIGN NEOPLASM OF SKIN OF SCALP AND NECK: Status: ACTIVE | Noted: 2023-01-19

## 2023-01-19 PROBLEM — D17.1 BENIGN LIPOMATOUS NEOPLASM OF SKIN AND SUBCUTANEOUS TISSUE OF TRUNK: Status: ACTIVE | Noted: 2023-01-19

## 2023-01-19 PROCEDURE — OTHER MIPS QUALITY: OTHER

## 2023-01-19 PROCEDURE — OTHER OBSERVATION AND MEASURE: OTHER

## 2023-01-19 PROCEDURE — 17000 DESTRUCT PREMALG LESION: CPT | Mod: 59

## 2023-01-19 PROCEDURE — OTHER TREATMENT REGIMEN: OTHER

## 2023-01-19 PROCEDURE — OTHER COUNSELING: OTHER

## 2023-01-19 PROCEDURE — OTHER PRESCRIPTION MEDICATION MANAGEMENT: OTHER

## 2023-01-19 PROCEDURE — OTHER OBSERVATION: OTHER

## 2023-01-19 PROCEDURE — 99213 OFFICE O/P EST LOW 20 MIN: CPT | Mod: 25

## 2023-01-19 PROCEDURE — 17110 DESTRUCT B9 LESION 1-14: CPT

## 2023-01-19 PROCEDURE — OTHER LIQUID NITROGEN: OTHER

## 2023-01-19 PROCEDURE — 17003 DESTRUCT PREMALG LES 2-14: CPT | Mod: 59

## 2023-01-19 PROCEDURE — OTHER PRESCRIPTION: OTHER

## 2023-01-19 RX ORDER — CICLOPIROX OLAMINE 7.7 MG/G
APPLY CREAM TOPICAL BID
Qty: 30 | Refills: 3 | Status: ERX | COMMUNITY
Start: 2023-01-19

## 2023-01-19 ASSESSMENT — LOCATION DETAILED DESCRIPTION DERM
LOCATION DETAILED: LEFT SUPERIOR LATERAL MIDBACK
LOCATION DETAILED: LEFT CENTRAL MALAR CHEEK
LOCATION DETAILED: LEFT POSTERIOR NECK
LOCATION DETAILED: LEFT ANKLE
LOCATION DETAILED: RIGHT LATERAL INFERIOR CHEST
LOCATION DETAILED: LEFT DORSAL GREAT TOE
LOCATION DETAILED: LEFT MEDIAL MALAR CHEEK
LOCATION DETAILED: LEFT SUPERIOR LATERAL MALAR CHEEK

## 2023-01-19 ASSESSMENT — LOCATION SIMPLE DESCRIPTION DERM
LOCATION SIMPLE: CHEST
LOCATION SIMPLE: LEFT GREAT TOE
LOCATION SIMPLE: POSTERIOR NECK
LOCATION SIMPLE: LEFT ANKLE
LOCATION SIMPLE: LEFT CHEEK
LOCATION SIMPLE: LEFT LOWER BACK

## 2023-01-19 ASSESSMENT — LOCATION ZONE DERM
LOCATION ZONE: NECK
LOCATION ZONE: LEG
LOCATION ZONE: TOE
LOCATION ZONE: FACE
LOCATION ZONE: TRUNK

## 2023-01-19 NOTE — PROCEDURE: LIQUID NITROGEN
Show Applicator Variable?: Yes
Duration Of Freeze Thaw-Cycle (Seconds): 0
Number Of Freeze-Thaw Cycles: 1 freeze-thaw cycle
Post-Care Instructions: I reviewed with the patient in detail post-care instructions. Patient is to wear sunprotection, and avoid picking at any of the treated lesions. Pt may apply Vaseline to crusted or scabbing areas.
Render Note In Bullet Format When Appropriate: No
Consent: The patient's consent was obtained including but not limited to risks of crusting, scabbing, blistering, scarring, darker or lighter pigmentary change, recurrence, incomplete removal and infection.
Detail Level: Detailed
Spray Paint Text: The liquid nitrogen was applied to the skin utilizing a spray paint frosting technique.
Medical Necessity Clause: This procedure was medically necessary because the lesions that were treated were:
Medical Necessity Information: It is in your best interest to select a reason for this procedure from the list below. All of these items fulfill various CMS LCD requirements except the new and changing color options.

## 2023-01-19 NOTE — PROCEDURE: TREATMENT REGIMEN
Plan: Nicotinamide (also known as Niacinamide) 500 mg twice daily. Do not take Niacin which causes flushing.\\nCounseled re supplement and potential GI side effects. Patient is not taking a seizure medication and does not have low platelets.\\nRecommenReaLync [facility] [place] website for additional information regarding supplements\\nRecFounderSyncded [email or web address] for information regarding specific brands of supplements Plan: Nicotinamide (also known as Niacinamide) 500 mg twice daily. Do not take Niacin which causes flushing.\\nCounseled re supplement and potential GI side effects. Patient is not taking a seizure medication and does not have low platelets.\\nRecommenFairchild Industrial Products Company [facility] [place] website for additional information regarding supplements\\nRecUman Pharmaded [email or web address] for information regarding specific brands of supplements

## 2023-01-19 NOTE — PROCEDURE: PRESCRIPTION MEDICATION MANAGEMENT
Plan: OTC Z-Bar or DermaHarmony Zinc Bar - wash face/ears twice daily (available on Amazon)\\nHydrocortisone 2.5% cream - apply twice daily as needed for facial/ear scaling (flares)\\nCiclopirox cream apply twice daily for facial redness and scaling (maintenance therapy) \\nKetoconazole shampoo 2-3 times weekly, rotate with OTC tea tree shampoo
Render In Strict Bullet Format?: No
Detail Level: Zone

## 2023-01-19 NOTE — PROCEDURE: TREATMENT REGIMEN
Plan: Wart stick 40% salicylic acid for any residual wart after healed from liquid nitrogen treatment - apply at night and cover with bandaid or tape, wash off in morning\\nZinc sulfate 220 mg supplement (available on Amazon) - take one daily, increase to twice daily as tolerated (potential gastrointestinal side effects) - maximum dose 10 mg/kg per day or 600 mg daily

## 2023-04-19 ENCOUNTER — APPOINTMENT (OUTPATIENT)
Dept: URBAN - METROPOLITAN AREA CLINIC 191 | Age: 33
Setting detail: DERMATOLOGY
End: 2023-05-02

## 2023-04-19 DIAGNOSIS — D17 BENIGN LIPOMATOUS NEOPLASM: ICD-10-CM

## 2023-04-19 DIAGNOSIS — Z85.828 PERSONAL HISTORY OF OTHER MALIGNANT NEOPLASM OF SKIN: ICD-10-CM

## 2023-04-19 DIAGNOSIS — B07.8 OTHER VIRAL WARTS: ICD-10-CM

## 2023-04-19 DIAGNOSIS — L20.84 INTRINSIC (ALLERGIC) ECZEMA: ICD-10-CM

## 2023-04-19 DIAGNOSIS — L21.8 OTHER SEBORRHEIC DERMATITIS: ICD-10-CM

## 2023-04-19 DIAGNOSIS — L57.0 ACTINIC KERATOSIS: ICD-10-CM

## 2023-04-19 PROBLEM — D23.4 OTHER BENIGN NEOPLASM OF SKIN OF SCALP AND NECK: Status: ACTIVE | Noted: 2023-04-19

## 2023-04-19 PROBLEM — D17.1 BENIGN LIPOMATOUS NEOPLASM OF SKIN AND SUBCUTANEOUS TISSUE OF TRUNK: Status: ACTIVE | Noted: 2023-04-19

## 2023-04-19 PROCEDURE — OTHER PRESCRIPTION MEDICATION MANAGEMENT: OTHER

## 2023-04-19 PROCEDURE — OTHER LIQUID NITROGEN: OTHER

## 2023-04-19 PROCEDURE — OTHER OBSERVATION AND MEASURE: OTHER

## 2023-04-19 PROCEDURE — 99213 OFFICE O/P EST LOW 20 MIN: CPT | Mod: 25

## 2023-04-19 PROCEDURE — OTHER OBSERVATION: OTHER

## 2023-04-19 PROCEDURE — OTHER MIPS QUALITY: OTHER

## 2023-04-19 PROCEDURE — OTHER COUNSELING: OTHER

## 2023-04-19 PROCEDURE — 17003 DESTRUCT PREMALG LES 2-14: CPT

## 2023-04-19 PROCEDURE — OTHER PRESCRIPTION: OTHER

## 2023-04-19 PROCEDURE — OTHER TREATMENT REGIMEN: OTHER

## 2023-04-19 PROCEDURE — 17000 DESTRUCT PREMALG LESION: CPT

## 2023-04-19 RX ORDER — CICLOPIROX OLAMINE 7.7 MG/G
APPLY CREAM TOPICAL BID
Qty: 30 | Refills: 3 | Status: ERX

## 2023-04-19 RX ORDER — TRIAMCINOLONE ACETONIDE 1 MG/G
APPLY CREAM TOPICAL BID PRN
Qty: 80 | Refills: 2 | Status: ERX | COMMUNITY
Start: 2023-04-19

## 2023-04-19 ASSESSMENT — LOCATION DETAILED DESCRIPTION DERM
LOCATION DETAILED: RIGHT CENTRAL MALAR CHEEK
LOCATION DETAILED: LEFT SUPERIOR LATERAL MIDBACK
LOCATION DETAILED: LEFT DISTAL PLANTAR GREAT TOE
LOCATION DETAILED: RIGHT LATERAL INFERIOR CHEST
LOCATION DETAILED: LEFT MEDIAL GREAT TOE
LOCATION DETAILED: LEFT POSTERIOR NECK

## 2023-04-19 ASSESSMENT — LOCATION ZONE DERM
LOCATION ZONE: NECK
LOCATION ZONE: TRUNK
LOCATION ZONE: FACE
LOCATION ZONE: TOE

## 2023-04-19 ASSESSMENT — LOCATION SIMPLE DESCRIPTION DERM
LOCATION SIMPLE: RIGHT CHEEK
LOCATION SIMPLE: LEFT GREAT TOE
LOCATION SIMPLE: CHEST
LOCATION SIMPLE: LEFT LOWER BACK
LOCATION SIMPLE: POSTERIOR NECK

## 2023-04-19 NOTE — PROCEDURE: PRESCRIPTION MEDICATION MANAGEMENT
Plan: OTC Z-Bar or DermaHarmony Zinc Bar - wash face/ears twice daily (available on Amazon)\\nHydrocortisone 2.5% cream - apply twice daily as needed for facial/ear scaling (flares)\\nCiclopirox cream apply twice daily for facial redness and scaling (maintenance therapy) \\nKetoconazole shampoo 2-3 times weekly, rotate with OTC tea tree shampoo
Render In Strict Bullet Format?: No
Detail Level: Zone
Plan: Use Cetaphil Restoraderm body wash for cleansing\\nApply Neutrogena hydroboost body gel cream fragrance free twice daily (for dryness), especially within 3 minutes of getting out of bath or shower\\nApply Triamcinolone twice daily as needed for flares (avoid face, armpits, groin - limit to two weeks in a row to one area)\\nAvoid scratching/rubbing affected areas\\nOTC Sarna lotion for sensitive skin as needed for itching\\nCall office if doesn't improve or persists

## 2023-04-19 NOTE — PROCEDURE: TREATMENT REGIMEN
Plan: Wart stick 40% salicylic acid for any residual wart after healed from liquid nitrogen treatment - apply at night and cover with bandaid or tape, wash off in morning

## 2023-04-19 NOTE — PROCEDURE: TREATMENT REGIMEN
Plan: Nicotinamide (also known as Niacinamide) 500 mg twice daily. Do not take Niacin which causes flushing.\\nCounseled re supplement and potential GI side effects. Patient is not taking a seizure medication and does not have low platelets.\\n[facility] [place] website for additional information regarding supplements\\n[email or web address] for information regarding specific brands of supplements Plan: Nicotinamide (also known as Niacinamide) 500 mg twice daily. Do not take Niacin which causes flushing.\\nCounseled re supplement and potential GI side effects. Patient is not taking a seizure medication and does not have low platelets.\\n[facility] [facility] [place] website for additional information regarding supplements\\[email or web address] for information regarding specific brands of supplements

## 2023-04-19 NOTE — PROCEDURE: OBSERVATION
Detail Level: Detailed
Size Of Lesion In Cm (Optional): 0
Size Of Lesion: 5 mm
Size Of Lesion: 2.5cm

## 2023-10-19 ENCOUNTER — APPOINTMENT (OUTPATIENT)
Dept: URBAN - METROPOLITAN AREA CLINIC 191 | Age: 33
Setting detail: DERMATOLOGY
End: 2023-10-19

## 2023-10-19 DIAGNOSIS — L81.4 OTHER MELANIN HYPERPIGMENTATION: ICD-10-CM

## 2023-10-19 DIAGNOSIS — L21.8 OTHER SEBORRHEIC DERMATITIS: ICD-10-CM

## 2023-10-19 DIAGNOSIS — D17 BENIGN LIPOMATOUS NEOPLASM: ICD-10-CM

## 2023-10-19 DIAGNOSIS — L738 OTHER SPECIFIED DISEASES OF HAIR AND HAIR FOLLICLES: ICD-10-CM

## 2023-10-19 DIAGNOSIS — Z85.828 PERSONAL HISTORY OF OTHER MALIGNANT NEOPLASM OF SKIN: ICD-10-CM

## 2023-10-19 DIAGNOSIS — L57.0 ACTINIC KERATOSIS: ICD-10-CM

## 2023-10-19 DIAGNOSIS — L663 OTHER SPECIFIED DISEASES OF HAIR AND HAIR FOLLICLES: ICD-10-CM

## 2023-10-19 DIAGNOSIS — L20.89 OTHER ATOPIC DERMATITIS: ICD-10-CM

## 2023-10-19 PROBLEM — D17.1 BENIGN LIPOMATOUS NEOPLASM OF SKIN AND SUBCUTANEOUS TISSUE OF TRUNK: Status: ACTIVE | Noted: 2023-10-19

## 2023-10-19 PROBLEM — D23.4 OTHER BENIGN NEOPLASM OF SKIN OF SCALP AND NECK: Status: ACTIVE | Noted: 2023-10-19

## 2023-10-19 PROBLEM — L02.92 FURUNCLE, UNSPECIFIED: Status: ACTIVE | Noted: 2023-10-19

## 2023-10-19 PROCEDURE — 99213 OFFICE O/P EST LOW 20 MIN: CPT | Mod: 25

## 2023-10-19 PROCEDURE — OTHER OBSERVATION: OTHER

## 2023-10-19 PROCEDURE — OTHER LIQUID NITROGEN: OTHER

## 2023-10-19 PROCEDURE — OTHER MIPS QUALITY: OTHER

## 2023-10-19 PROCEDURE — 17000 DESTRUCT PREMALG LESION: CPT

## 2023-10-19 PROCEDURE — OTHER PRESCRIPTION: OTHER

## 2023-10-19 PROCEDURE — OTHER OBSERVATION AND MEASURE: OTHER

## 2023-10-19 PROCEDURE — OTHER PRESCRIPTION MEDICATION MANAGEMENT: OTHER

## 2023-10-19 PROCEDURE — OTHER COUNSELING: OTHER

## 2023-10-19 RX ORDER — CLINDAMYCIN PHOSPHATE 10 MG/ML
APPLY SOLUTION TOPICAL BID PRN
Qty: 60 | Refills: 3 | Status: ERX | COMMUNITY
Start: 2023-10-19

## 2023-10-19 RX ORDER — CHLORHEXIDINE GLUCONATE 4 G/100ML
SOLUTION TOPICAL AS DIRECTED
Qty: 473 | Refills: 3 | Status: ERX | COMMUNITY
Start: 2023-10-19

## 2023-10-19 ASSESSMENT — LOCATION DETAILED DESCRIPTION DERM
LOCATION DETAILED: RIGHT LATERAL INFERIOR CHEST
LOCATION DETAILED: LEFT SUPERIOR LATERAL MIDBACK
LOCATION DETAILED: RIGHT INFERIOR MEDIAL FOREHEAD
LOCATION DETAILED: LEFT POSTERIOR NECK
LOCATION DETAILED: RIGHT PROXIMAL DORSAL FOREARM

## 2023-10-19 ASSESSMENT — LOCATION ZONE DERM
LOCATION ZONE: FACE
LOCATION ZONE: ARM
LOCATION ZONE: NECK
LOCATION ZONE: TRUNK

## 2023-10-19 ASSESSMENT — LOCATION SIMPLE DESCRIPTION DERM
LOCATION SIMPLE: RIGHT FOREARM
LOCATION SIMPLE: RIGHT FOREHEAD
LOCATION SIMPLE: CHEST
LOCATION SIMPLE: LEFT LOWER BACK
LOCATION SIMPLE: POSTERIOR NECK

## 2023-10-19 NOTE — PROCEDURE: LIQUID NITROGEN
Detail Level: Detailed
Consent: The patient's consent was obtained including but not limited to risks of crusting, scabbing, blistering, scarring, darker or lighter pigmentary change, recurrence, incomplete removal and infection.
Post-Care Instructions: I reviewed with the patient in detail post-care instructions. Patient is to wear sunprotection, and avoid picking at any of the treated lesions. Pt may apply Vaseline to crusted or scabbing areas.
Render Post-Care Instructions In Note?: no
Number Of Freeze-Thaw Cycles: 1 freeze-thaw cycle
Duration Of Freeze Thaw-Cycle (Seconds): 0
Show Applicator Variable?: Yes

## 2024-04-22 ENCOUNTER — APPOINTMENT (OUTPATIENT)
Dept: URBAN - METROPOLITAN AREA CLINIC 191 | Age: 34
Setting detail: DERMATOLOGY
End: 2024-05-05

## 2024-04-22 DIAGNOSIS — L21.8 OTHER SEBORRHEIC DERMATITIS: ICD-10-CM

## 2024-04-22 DIAGNOSIS — Z85.828 PERSONAL HISTORY OF OTHER MALIGNANT NEOPLASM OF SKIN: ICD-10-CM

## 2024-04-22 DIAGNOSIS — L738 OTHER SPECIFIED DISEASES OF HAIR AND HAIR FOLLICLES: ICD-10-CM

## 2024-04-22 DIAGNOSIS — L81.4 OTHER MELANIN HYPERPIGMENTATION: ICD-10-CM

## 2024-04-22 DIAGNOSIS — D17 BENIGN LIPOMATOUS NEOPLASM: ICD-10-CM

## 2024-04-22 DIAGNOSIS — L663 OTHER SPECIFIED DISEASES OF HAIR AND HAIR FOLLICLES: ICD-10-CM

## 2024-04-22 DIAGNOSIS — L57.0 ACTINIC KERATOSIS: ICD-10-CM

## 2024-04-22 PROBLEM — D17.1 BENIGN LIPOMATOUS NEOPLASM OF SKIN AND SUBCUTANEOUS TISSUE OF TRUNK: Status: ACTIVE | Noted: 2024-04-22

## 2024-04-22 PROBLEM — D23.4 OTHER BENIGN NEOPLASM OF SKIN OF SCALP AND NECK: Status: ACTIVE | Noted: 2024-04-22

## 2024-04-22 PROBLEM — L02.92 FURUNCLE, UNSPECIFIED: Status: ACTIVE | Noted: 2024-04-22

## 2024-04-22 PROCEDURE — OTHER MIPS QUALITY: OTHER

## 2024-04-22 PROCEDURE — OTHER OBSERVATION: OTHER

## 2024-04-22 PROCEDURE — OTHER PRESCRIPTION MEDICATION MANAGEMENT: OTHER

## 2024-04-22 PROCEDURE — OTHER OBSERVATION AND MEASURE: OTHER

## 2024-04-22 PROCEDURE — 99213 OFFICE O/P EST LOW 20 MIN: CPT | Mod: 25

## 2024-04-22 PROCEDURE — OTHER LIQUID NITROGEN: OTHER

## 2024-04-22 PROCEDURE — 17000 DESTRUCT PREMALG LESION: CPT

## 2024-04-22 PROCEDURE — 17003 DESTRUCT PREMALG LES 2-14: CPT

## 2024-04-22 PROCEDURE — OTHER COUNSELING: OTHER

## 2024-04-22 ASSESSMENT — LOCATION DETAILED DESCRIPTION DERM
LOCATION DETAILED: LEFT INFERIOR CENTRAL MALAR CHEEK
LOCATION DETAILED: RIGHT PROXIMAL DORSAL FOREARM
LOCATION DETAILED: RIGHT POSTERIOR SHOULDER
LOCATION DETAILED: LEFT SUPERIOR LATERAL MIDBACK
LOCATION DETAILED: LEFT NASAL ALA
LOCATION DETAILED: LEFT PROXIMAL DORSAL FOREARM
LOCATION DETAILED: LEFT POSTERIOR NECK
LOCATION DETAILED: LEFT CENTRAL MALAR CHEEK
LOCATION DETAILED: RIGHT LATERAL INFERIOR CHEST
LOCATION DETAILED: LEFT MEDIAL MALAR CHEEK
LOCATION DETAILED: LEFT POSTERIOR SHOULDER

## 2024-04-22 ASSESSMENT — LOCATION SIMPLE DESCRIPTION DERM
LOCATION SIMPLE: POSTERIOR NECK
LOCATION SIMPLE: LEFT FOREARM
LOCATION SIMPLE: RIGHT FOREARM
LOCATION SIMPLE: LEFT CHEEK
LOCATION SIMPLE: LEFT SHOULDER
LOCATION SIMPLE: LEFT LOWER BACK
LOCATION SIMPLE: RIGHT SHOULDER
LOCATION SIMPLE: CHEST
LOCATION SIMPLE: LEFT NOSE

## 2024-04-22 ASSESSMENT — LOCATION ZONE DERM
LOCATION ZONE: NOSE
LOCATION ZONE: ARM
LOCATION ZONE: TRUNK
LOCATION ZONE: FACE
LOCATION ZONE: NECK

## 2024-04-22 NOTE — PROCEDURE: OBSERVATION
Detail Level: Detailed
Size Of Lesion In Cm (Optional): 0
Size Of Lesion: 5 mm
Size Of Lesion: 2.5 cm
Size Of Lesion: 3.5 mm

## 2024-04-22 NOTE — PROCEDURE: PRESCRIPTION MEDICATION MANAGEMENT
Plan: OTC Z-Bar or DermaHarmony Zinc Bar - wash face/ears twice daily (available on [employer])\\nHydrocortisone 2.5% cream - apply twice daily as needed for facial/ear scaling (flares)\\nCiclopirox cream apply twice daily for facial redness and scaling (maintenance therapy) \\nKetoconazole shampoo 2-3 times weekly, rotate with OTC tea tree shampoo
Render In Strict Bullet Format?: No
Detail Level: Zone
Plan: Chlorhexidine (generic Hibiclens) - wash affected areas daily in shower (avoid eye/ear exposure)\\nClindamycin solution - apply twice daily as needed for breakouts

## 2024-10-22 ENCOUNTER — APPOINTMENT (OUTPATIENT)
Dept: URBAN - METROPOLITAN AREA CLINIC 191 | Age: 34
Setting detail: DERMATOLOGY
End: 2024-10-22

## 2024-10-22 DIAGNOSIS — L81.4 OTHER MELANIN HYPERPIGMENTATION: ICD-10-CM

## 2024-10-22 DIAGNOSIS — Z85.828 PERSONAL HISTORY OF OTHER MALIGNANT NEOPLASM OF SKIN: ICD-10-CM

## 2024-10-22 DIAGNOSIS — L21.8 OTHER SEBORRHEIC DERMATITIS: ICD-10-CM

## 2024-10-22 DIAGNOSIS — L57.0 ACTINIC KERATOSIS: ICD-10-CM

## 2024-10-22 PROBLEM — D23.4 OTHER BENIGN NEOPLASM OF SKIN OF SCALP AND NECK: Status: ACTIVE | Noted: 2024-10-22

## 2024-10-22 PROCEDURE — 99214 OFFICE O/P EST MOD 30 MIN: CPT | Mod: 25

## 2024-10-22 PROCEDURE — OTHER OBSERVATION: OTHER

## 2024-10-22 PROCEDURE — OTHER COUNSELING: OTHER

## 2024-10-22 PROCEDURE — 17003 DESTRUCT PREMALG LES 2-14: CPT

## 2024-10-22 PROCEDURE — 17000 DESTRUCT PREMALG LESION: CPT

## 2024-10-22 PROCEDURE — OTHER OBSERVATION AND MEASURE: OTHER

## 2024-10-22 PROCEDURE — OTHER MIPS QUALITY: OTHER

## 2024-10-22 PROCEDURE — OTHER PRESCRIPTION MEDICATION MANAGEMENT: OTHER

## 2024-10-22 PROCEDURE — OTHER LIQUID NITROGEN: OTHER

## 2024-10-22 ASSESSMENT — LOCATION DETAILED DESCRIPTION DERM
LOCATION DETAILED: RIGHT FOREHEAD
LOCATION DETAILED: LEFT CENTRAL EYEBROW
LOCATION DETAILED: LEFT NASAL ALA
LOCATION DETAILED: LEFT POSTERIOR NECK
LOCATION DETAILED: RIGHT LATERAL INFERIOR CHEST
LOCATION DETAILED: LEFT VENTRAL PROXIMAL FOREARM
LOCATION DETAILED: LEFT MEDIAL UPPER BACK
LOCATION DETAILED: LEFT INFERIOR CENTRAL MALAR CHEEK
LOCATION DETAILED: RIGHT INFERIOR FOREHEAD
LOCATION DETAILED: RIGHT VENTRAL PROXIMAL FOREARM

## 2024-10-22 ASSESSMENT — LOCATION SIMPLE DESCRIPTION DERM
LOCATION SIMPLE: CHEST
LOCATION SIMPLE: LEFT NOSE
LOCATION SIMPLE: LEFT CHEEK
LOCATION SIMPLE: RIGHT FOREARM
LOCATION SIMPLE: RIGHT FOREHEAD
LOCATION SIMPLE: LEFT FOREARM
LOCATION SIMPLE: POSTERIOR NECK
LOCATION SIMPLE: LEFT UPPER BACK
LOCATION SIMPLE: LEFT EYEBROW

## 2024-10-22 ASSESSMENT — LOCATION ZONE DERM
LOCATION ZONE: ARM
LOCATION ZONE: TRUNK
LOCATION ZONE: NOSE
LOCATION ZONE: NECK
LOCATION ZONE: FACE

## 2024-10-22 NOTE — PROCEDURE: PRESCRIPTION MEDICATION MANAGEMENT
Plan: OTC Z-Bar or DermaHarmony Zinc Bar - wash face/ears twice daily (available on [employer])\\nHydrocortisone 2.5% cream - apply twice daily as needed for facial/ear scaling for flares (decrease use)\\nCiclopirox cream apply twice daily for facial redness and scaling for maintenance therapy (increase use)\\nKetoconazole shampoo 2-3 times weekly, rotate with OTC tea tree shampoo
Render In Strict Bullet Format?: No
Detail Level: Zone
Plan: Fluorouracil 5% cream (generic Efudex also known as 5FU) - apply to right temple twice daily for 2 weeks as tolerated (begin after healed from liquid nitrogen treatment and wash hands after application)\\nApply Vaseline twice daily after discontinuing Fluorouracil (Efudex) cream. Use until completely healed.\\nDiscontinue Fluorouracil (Efudex) cream and contact office if you develop pain, blistering, honey-crusted lesions, or symptoms of infection or allergic reaction

## 2025-03-18 ENCOUNTER — APPOINTMENT (OUTPATIENT)
Dept: URBAN - METROPOLITAN AREA CLINIC 191 | Age: 35
Setting detail: DERMATOLOGY
End: 2025-04-02

## 2025-03-18 DIAGNOSIS — Z85.828 PERSONAL HISTORY OF OTHER MALIGNANT NEOPLASM OF SKIN: ICD-10-CM

## 2025-03-18 DIAGNOSIS — D18.0 HEMANGIOMA: ICD-10-CM

## 2025-03-18 DIAGNOSIS — L81.4 OTHER MELANIN HYPERPIGMENTATION: ICD-10-CM

## 2025-03-18 DIAGNOSIS — L21.8 OTHER SEBORRHEIC DERMATITIS: ICD-10-CM

## 2025-03-18 DIAGNOSIS — D22 MELANOCYTIC NEVI: ICD-10-CM

## 2025-03-18 DIAGNOSIS — B35.3 TINEA PEDIS: ICD-10-CM

## 2025-03-18 PROBLEM — D23.39 OTHER BENIGN NEOPLASM OF SKIN OF OTHER PARTS OF FACE: Status: ACTIVE | Noted: 2025-03-18

## 2025-03-18 PROBLEM — D23.5 OTHER BENIGN NEOPLASM OF SKIN OF TRUNK: Status: ACTIVE | Noted: 2025-03-18

## 2025-03-18 PROBLEM — D22.5 MELANOCYTIC NEVI OF TRUNK: Status: ACTIVE | Noted: 2025-03-18

## 2025-03-18 PROBLEM — D18.01 HEMANGIOMA OF SKIN AND SUBCUTANEOUS TISSUE: Status: ACTIVE | Noted: 2025-03-18

## 2025-03-18 PROCEDURE — 99213 OFFICE O/P EST LOW 20 MIN: CPT

## 2025-03-18 PROCEDURE — OTHER COUNSELING: OTHER

## 2025-03-18 PROCEDURE — OTHER MIPS QUALITY: OTHER

## 2025-03-18 PROCEDURE — OTHER TREATMENT REGIMEN: OTHER

## 2025-03-18 PROCEDURE — OTHER OBSERVATION: OTHER

## 2025-03-18 PROCEDURE — OTHER PRESCRIPTION MEDICATION MANAGEMENT: OTHER

## 2025-03-18 ASSESSMENT — LOCATION DETAILED DESCRIPTION DERM
LOCATION DETAILED: RIGHT PROXIMAL POSTERIOR UPPER ARM
LOCATION DETAILED: RIGHT ANTERIOR DISTAL UPPER ARM
LOCATION DETAILED: LEFT POSTERIOR SHOULDER
LOCATION DETAILED: LEFT POSTERIOR NECK
LOCATION DETAILED: STERNUM
LOCATION DETAILED: LEFT PROXIMAL POSTERIOR UPPER ARM
LOCATION DETAILED: RIGHT POSTERIOR SHOULDER
LOCATION DETAILED: RIGHT LATERAL INFERIOR CHEST

## 2025-03-18 ASSESSMENT — LOCATION ZONE DERM
LOCATION ZONE: NECK
LOCATION ZONE: ARM
LOCATION ZONE: TRUNK

## 2025-03-18 ASSESSMENT — LOCATION SIMPLE DESCRIPTION DERM
LOCATION SIMPLE: RIGHT UPPER ARM
LOCATION SIMPLE: RIGHT SHOULDER
LOCATION SIMPLE: CHEST
LOCATION SIMPLE: LEFT SHOULDER
LOCATION SIMPLE: LEFT UPPER ARM
LOCATION SIMPLE: POSTERIOR NECK

## 2025-03-18 NOTE — PROCEDURE: PRESCRIPTION MEDICATION MANAGEMENT
Plan: OTC Z-Bar or DermaHarmony Zinc Bar - wash face/ears twice daily (available on [employer])\\nContinue Hydrocortisone 2.5% cream - apply twice daily as needed for facial/ear scaling for flares (decrease use)\\nContinue Ciclopirox cream apply twice daily for facial redness and scaling for maintenance therapy (increase use)\\nContinue Ketoconazole shampoo 2-3 times weekly, rotate with OTC tea tree shampoo
Render In Strict Bullet Format?: No
Detail Level: Zone

## 2025-03-18 NOTE — PROCEDURE: OBSERVATION
Detail Level: Detailed
Size Of Lesion In Cm (Optional): 0
Size Of Lesion: 3.5mm
Instructions (Optional): Unchanged at 3/18/25. Photo taken.
Size Of Lesion: 3mm
Instructions (Optional): Unchanged 3/18/25. Photo taken.

## 2025-03-18 NOTE — PROCEDURE: TREATMENT REGIMEN
Detail Level: Zone
Plan: Butenafine Hydrochloride 1% cream (Lotrimin Ultra) twice daily to feet/between toes as needed\\nDry feet/between toes with hair dryer after shower/bath